# Patient Record
Sex: FEMALE | Race: WHITE | NOT HISPANIC OR LATINO | Employment: FULL TIME | URBAN - METROPOLITAN AREA
[De-identification: names, ages, dates, MRNs, and addresses within clinical notes are randomized per-mention and may not be internally consistent; named-entity substitution may affect disease eponyms.]

---

## 2017-03-29 ENCOUNTER — HOSPITAL ENCOUNTER (EMERGENCY)
Facility: CLINIC | Age: 32
Discharge: HOME | End: 2017-03-29
Attending: EMERGENCY MEDICINE | Admitting: EMERGENCY MEDICINE

## 2017-03-29 VITALS
HEIGHT: 66 IN | RESPIRATION RATE: 16 BRPM | TEMPERATURE: 97.7 F | SYSTOLIC BLOOD PRESSURE: 118 MMHG | OXYGEN SATURATION: 100 % | DIASTOLIC BLOOD PRESSURE: 89 MMHG

## 2017-03-29 DIAGNOSIS — M79.622 PAIN OF LEFT UPPER ARM: ICD-10-CM

## 2017-03-29 PROCEDURE — 99283 EMERGENCY DEPT VISIT LOW MDM: CPT | Performed by: EMERGENCY MEDICINE

## 2017-03-29 PROCEDURE — 99282 EMERGENCY DEPT VISIT SF MDM: CPT

## 2017-03-29 RX ORDER — MORPHINE SULFATE 15 MG/1
15 TABLET ORAL EVERY 4 HOURS PRN
Qty: 8 TABLET | Refills: 0 | Status: SHIPPED | OUTPATIENT
Start: 2017-03-29 | End: 2021-09-21

## 2017-03-29 NOTE — ED AVS SNAPSHOT
[facility] Emergency Department    [address] [address]    Phone:  [phone]    Fax:  [phone]                                       Ninfa Caicedo   MRN: [id number]    Department:  [facility] Emergency Department   Date of Visit:  3/29/2017           Patient Information     Date Of Birth          1985        Your diagnoses for this visit were:     Pain of left upper arm        You were seen by Omer Biggs MD.        Discharge Instructions       I am uncertain what is causing your arm pain, but there does not appear to be any broken bones or infections at this time. Return to the emergency department a few have a rash, fever, chest pain, difficulty breathing, or other concerns.  Otherwise follow up in primary care.    Continue to use heat and ice as well as acetaminophen and ibuprofen to help with her symptoms.  Use morphine as needed for pain that is not controlled by the prior two medications.    Morphine is an addictive narcotic medication, please only take it when the pain is more than can be controlled by acetaminophen or ibuprofen alone. It will also make you lightheaded, nauseated, constipated.  Do not drive, operate heavy machinery, or take care of young children while taking this medication.    Repeated studies have shown that the longer you use narcotic pain medications, the longer it is until you return to normal function. It is our recommendation that you taper off narcotics as quickly as possible with the goal of returning to normal function or near normal function. Long term use of narcotics quickly results in growing tolerance to the medication (less of the benefits) and increased dependence (more of the bad side effects).    Pain is very difficult to treat and we can very rarely take away pain completely. If you are having difficulty with pain over several weeks after an injury, you may need to start different medications and therapies (such as  physical therapy, graded exercise, massage, and acupuncture). Please talk about this with your regular doctor.     24 Hour Appointment Hotline       To make an appointment at any [facility] clinic, call [phone] ([phone]). If you don't have a family doctor or clinic, we will help you find one. [facility] clinics are conveniently located to serve the needs of you and your family.             Review of your medicines      START taking        Dose / Directions Last dose taken    morphine 15 MG IR tablet   Commonly known as:  MSIR   Dose:  15 mg   Quantity:  8 tablet        Take 1 tablet (15 mg) by mouth every 4 hours as needed for moderate to severe pain   Refills:  0          Our records show that you are taking the medicines listed below. If these are incorrect, please call your family doctor or clinic.        Dose / Directions Last dose taken    acetaminophen 500 MG tablet   Commonly known as:  TYLENOL   Dose:  1000 mg        Take 2 tablets (1,000 mg) by mouth every 6 hours as needed for mild pain or fever   Refills:  0        HYDROmorphone 2 MG tablet   Commonly known as:  DILAUDID   Dose:  1-2 mg   Quantity:  10 tablet        Take 0.5-1 tablets (1-2 mg) by mouth every 4 hours as needed for moderate to severe pain   Refills:  0        ibuprofen 400 MG tablet   Commonly known as:  ADVIL/MOTRIN   Dose:  400-800 mg   Quantity:  60 tablet        Take 1-2 tablets (400-800 mg) by mouth every 6 hours as needed for other (cramping)   Refills:  0                Prescriptions were sent or printed at these locations (1 Prescription)                   Other Prescriptions                Printed at Department/Unit printer (1 of 1)         morphine (MSIR) 15 MG IR tablet                Orders Needing Specimen Collection     None      Pending Results     No orders found from 3/27/2017 to 3/30/2017.            Pending Culture Results     No orders found from 3/27/2017 to 3/30/2017.             Test Results from your  hospital stay            Thank you for choosing [facility]       Thank you for choosing [facility] for your care. Our goal is always to provide you with excellent care. Hearing back from our patients is one way we can continue to improve our services. Please take a few minutes to complete the written survey that you may receive in the mail after you visit with us. Thank you!        [employer] Information     [employer] gives you secure access to your electronic health record. If you see a primary care provider, you can also send messages to your care team and make appointments. If you have questions, please call your primary care clinic.  If you do not have a primary care provider, please call [phone] and they will assist you.        Care EveryWhere ID     This is your Care EveryWhere ID. This could be used by other organizations to access your [facility] medical records  [id number]        After Visit Summary       This is your record. Keep this with you and show to your community pharmacist(s) and doctor(s) at your next visit.

## 2017-03-29 NOTE — ED AVS SNAPSHOT
[facility] Emergency Department    [address] [address]    Phone:  [phone]    Fax:  [phone]                                       Ninfa Caicedo   MRN: [id number]    Department:  [facility] Emergency Department   Date of Visit:  3/29/2017           After Visit Summary Signature Page     I have received my discharge instructions, and my questions have been answered. I have discussed any challenges I see with this plan with the nurse or doctor.    ..........................................................................................................................................  Patient/Patient Representative Signature      ..........................................................................................................................................  Patient Representative Print Name and Relationship to Patient    ..................................................               ................................................  Date                                            Time    ..........................................................................................................................................  Reviewed by Signature/Title    ...................................................              ..............................................  Date                                                            Time

## 2017-03-30 NOTE — ED PROVIDER NOTES
"  History     Chief Complaint   Patient presents with     Shoulder Pain     started yesterday, no known injury     HPI  Ninfa Caicedo is a 31 year old female who presents for left arm pain.  Pain started about 24 hours prior to arrival, no known trauma.  Pain started worse in the back of the shoulder, and has migrated around the whole left upper extremity.  She is not sure what makes it better or changes the location.  Pain is worse with moving the arm.  She does have 4 children ages 2, 4, and 8 at home, but does not feel that she has been changing her activities or done anything differently recently to care for them.  No fever, rash, swelling.  She has normal range of motion and no loss of sensation or strength.  She has tried acetaminophen, ibuprofen, and heat without improvement.  Pain is rated as severe, currently located in the radial aspect of the distal forearm.  She denies chest pain, difficulty breathing, abdominal pain, body aches, recent trauma, neck pain, torticollis.    Previously healthy  No daily medications  No known drug allergies, she does have sensitivities to hydrocodone and tramadol and says she vomited when taking penicillin in combination to opiate pain medications, and I have updated her allergy list to reflect this  Doesn't smoke, drink alcohol, or use illicit drugs    I have reviewed the Medications, Allergies, Past Medical and Surgical History, and Social History in the Epic system.    Review of Systems  Pertinent positives and negatives listed in the HPI, all other systems reviewed and are negative.    Physical Exam   BP: (!) 144/100  Heart Rate: 66  Temp: 97.7  F (36.5  C)  Resp: 16  Height: 167.6 cm (5' 6\")  SpO2: 100 %  Physical Exam   Constitutional: She is oriented to person, place, and time. She appears well-developed and well-nourished. No distress.   HENT:   Head: Normocephalic and atraumatic.   Eyes: No scleral icterus.   Neck: Normal range of motion. Neck supple. "   Musculoskeletal:   Left Shoulder: no deformity, erythema or warmth appreciated; no tenderness over clavicle, AC joint, acromion, scapula, coracoid, or proximal humerus; full ROM; no weakness of shoulder abduction or forward flexion; no weakness of rotator cuff muscles; negative impingement  Left Elbow: no deformity, erythema, or warmth appreciated; no tenderness over medial and lateral humeral condyles, olecranon, or radial head; full ROM including pronation and supination of the forearm; normal strength for flexion, extension, pronation and supination.  Left wrist: No deformity, erythema, warmth, or tenderness.  No scaphoid tenderness.  Left Hand: Sensation intact at 1st dorsal webspace, thenar eminence, and volar surface of 5th digit.  Motor strength normal for wrist extension, index-thumb opposition, and finger abduction/adduction.   Neurological: She is alert and oriented to person, place, and time.   Skin: Skin is warm and dry. No rash noted. She is not diaphoretic. No erythema. No pallor.       ED Course     ED Course     Procedures             Critical Care time:  none               Labs Ordered and Resulted from Time of ED Arrival Up to the Time of Departure from the ED - No data to display    Assessments & Plan (with Medical Decision Making)   31-year-old female who presents with left arm pain.  Differential includes muscle spasms, varicella zoster, cellulitis, occult trauma.  No signs of trauma on examination.  Normal examination as above.  No rash to indicate sausage her.  She has full range of motion and good pulses.  Heart rate 66, temperature 36.5 C, SPO2 100% on room air.  Unclear what is causing her symptoms, however I do not believe that imaging is going to be helpful at this time.  I discussed the case with another emergency department physician on site here and he agrees that this does not seem typical of any pattern he has heard of.  At this point the patient is encouraged to continue using  acetaminophen and ibuprofen for the pain as well as heat and ice.  She is given a short course of morphine to help with the pain and told to return if she has continued symptoms in 2-3 days for a recheck or return sooner for some.  The patient is in agreement with this plan.    I have reviewed the nursing notes.    I have reviewed the findings, diagnosis, plan and need for follow up with the patient.    New Prescriptions    MORPHINE (MSIR) 15 MG IR TABLET    Take 1 tablet (15 mg) by mouth every 4 hours as needed for moderate to severe pain       Final diagnoses:   Pain of left upper arm       3/29/2017   [facility] EMERGENCY DEPARTMENT     Omer Biggs MD  03/29/17 9271

## 2017-03-30 NOTE — DISCHARGE INSTRUCTIONS
I am uncertain what is causing your arm pain, but there does not appear to be any broken bones or infections at this time. Return to the emergency department a few have a rash, fever, chest pain, difficulty breathing, or other concerns.  Otherwise follow up in primary care.    Continue to use heat and ice as well as acetaminophen and ibuprofen to help with her symptoms.  Use morphine as needed for pain that is not controlled by the prior two medications.    Morphine is an addictive narcotic medication, please only take it when the pain is more than can be controlled by acetaminophen or ibuprofen alone. It will also make you lightheaded, nauseated, constipated.  Do not drive, operate heavy machinery, or take care of young children while taking this medication.    Repeated studies have shown that the longer you use narcotic pain medications, the longer it is until you return to normal function. It is our recommendation that you taper off narcotics as quickly as possible with the goal of returning to normal function or near normal function. Long term use of narcotics quickly results in growing tolerance to the medication (less of the benefits) and increased dependence (more of the bad side effects).    Pain is very difficult to treat and we can very rarely take away pain completely. If you are having difficulty with pain over several weeks after an injury, you may need to start different medications and therapies (such as physical therapy, graded exercise, massage, and acupuncture). Please talk about this with your regular doctor.

## 2017-07-08 ENCOUNTER — HEALTH MAINTENANCE LETTER (OUTPATIENT)
Age: 32
End: 2017-07-08

## 2019-02-25 ENCOUNTER — HOSPITAL ENCOUNTER (EMERGENCY)
Facility: CLINIC | Age: 34
Discharge: HOME | End: 2019-02-25
Attending: NURSE PRACTITIONER | Admitting: NURSE PRACTITIONER

## 2019-02-25 VITALS
RESPIRATION RATE: 20 BRPM | OXYGEN SATURATION: 99 % | DIASTOLIC BLOOD PRESSURE: 78 MMHG | HEART RATE: 115 BPM | TEMPERATURE: 100.6 F | SYSTOLIC BLOOD PRESSURE: 124 MMHG

## 2019-02-25 DIAGNOSIS — J02.0 ACUTE STREPTOCOCCAL PHARYNGITIS: ICD-10-CM

## 2019-02-25 LAB
INTERNAL QC OK POCT: YES
S PYO AG THROAT QL IA.RAPID: POSITIVE

## 2019-02-25 PROCEDURE — 99214 OFFICE O/P EST MOD 30 MIN: CPT | Mod: Z6 | Performed by: NURSE PRACTITIONER

## 2019-02-25 PROCEDURE — 87880 STREP A ASSAY W/OPTIC: CPT | Performed by: NURSE PRACTITIONER

## 2019-02-25 PROCEDURE — G0463 HOSPITAL OUTPT CLINIC VISIT: HCPCS | Performed by: NURSE PRACTITIONER

## 2019-02-25 RX ORDER — DEXAMETHASONE 4 MG/1
10 TABLET ORAL ONCE
Qty: 2.5 TABLET | Refills: 0 | Status: SHIPPED | OUTPATIENT
Start: 2019-02-25 | End: 2019-03-20

## 2019-02-25 RX ORDER — AMOXICILLIN 500 MG/1
500 CAPSULE ORAL 2 TIMES DAILY
Qty: 20 CAPSULE | Refills: 0 | Status: SHIPPED | OUTPATIENT
Start: 2019-02-25 | End: 2019-03-07

## 2019-02-25 ASSESSMENT — ENCOUNTER SYMPTOMS
ABDOMINAL PAIN: 0
CHILLS: 1
HEADACHES: 1
TROUBLE SWALLOWING: 0
MYALGIAS: 1
FEVER: 1
SHORTNESS OF BREATH: 0
SORE THROAT: 1
VOMITING: 0
NAUSEA: 0
COUGH: 0
FATIGUE: 1

## 2019-02-25 NOTE — ED AVS SNAPSHOT
[facility] Emergency Department  [address] [address]  Phone:  [phone]  Fax:  [phone]                                    Ninfa Caicedo   MRN: [id number]    Department:  [facility] Emergency Department   Date of Visit:  2/25/2019           After Visit Summary Signature Page    I have received my discharge instructions, and my questions have been answered. I have discussed any challenges I see with this plan with the nurse or doctor.    ..........................................................................................................................................  Patient/Patient Representative Signature      ..........................................................................................................................................  Patient Representative Print Name and Relationship to Patient    ..................................................               ................................................  Date                                   Time    ..........................................................................................................................................  Reviewed by Signature/Title    ...................................................              ..............................................  Date                                               Time          22EPIC Rev 08/18

## 2019-02-25 NOTE — ED PROVIDER NOTES
History     Chief Complaint   Patient presents with     Pharyngitis     HPI  Ninfa Caicedo is a 33 year old female who presents to urgent care for evaluation of sore throat and fever.  Symptoms started 2 days ago.  Accompanied by myalgias.  Denies nausea or vomiting.  Denies cough or nasal congestion.  Increased pain with swallowing.  Drinking fluids, but reports it is difficult due to pain with swallowing.    Allergies:  No Known Allergies    Problem List:    Patient Active Problem List    Diagnosis Date Noted     Pyelonephritis 04/08/2016     Priority: Medium     Vaginal delivery 06/10/2014     Priority: Medium     Encounter for supervision of other normal pregnancy 03/17/2014     Priority: Medium     Diagnosis updated by automated process. Provider to review and confirm.       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  Swab negative 3/14  Swab negative 6/14         Radial styloid tenosynovitis 04/12/2005     Priority: Medium        Past Medical History:    Past Medical History:   Diagnosis Date     Allergic rhinitis due to other allergen      Chickenpox      Tendonitis        Past Surgical History:    Past Surgical History:   Procedure Laterality Date     ORTHOPEDIC SURGERY      bilat wrist       Family History:    Family History   Problem Relation Age of Onset     Psychotic Disorder Father         depression     Prostate Cancer Maternal Grandfather      Diabetes Maternal Grandfather      Cardiovascular Paternal Grandfather         pacemaker     Hypertension Mother      Gastrointestinal Disease Maternal Grandfather         Hepatitis C-blood transfusion     Depression Father        Social History:  Marital Status:  Single [1]  Social History     Tobacco Use     Smoking status: Never Smoker      Smokeless tobacco: Never Used   Substance Use Topics     Alcohol use: No     Drug use: No        Medications:      amoxicillin (AMOXIL) 500 MG capsule   dexamethasone (DECADRON) 4 MG tablet   acetaminophen (TYLENOL) 500 MG tablet   HYDROmorphone (DILAUDID) 2 MG tablet   ibuprofen (ADVIL,MOTRIN) 400 MG tablet   morphine (MSIR) 15 MG IR tablet         Review of Systems   Constitutional: Positive for chills, fatigue and fever.   HENT: Positive for sore throat. Negative for congestion, ear pain and trouble swallowing.    Respiratory: Negative for cough and shortness of breath.    Cardiovascular: Negative for chest pain.   Gastrointestinal: Negative for abdominal pain, nausea and vomiting.   Musculoskeletal: Positive for myalgias.   Skin: Negative for rash.   Neurological: Positive for headaches.       Physical Exam   BP: 124/78  Pulse: 115  Temp: 100.6  F (38.1  C)  Resp: 20  SpO2: 99 %      Physical Exam  GENERAL APPEARANCE: healthy, alert and no distress  EYES: EOMI,  PERRL, conjunctiva clear  HENT: ear canals and TM's normal.  Nose normal. Bilateral tonsillary hypertrophy (3+/3+) Posterior oropharynx erythema without exudate.  Uvula is midline.  No unilateral peritonsillar swelling. No trismus. Voice sounds muffled.  NECK: supple, nontender, anterior cervical bilateral lymphadenopathy  RESP: lungs clear to auscultation - no rales, rhonchi or wheezes  CV: tachycardia present and regular rhythm, normal S1 S2, no murmur noted    ED Course        Procedures               Results for orders placed or performed during the hospital encounter of 02/25/19 (from the past 24 hour(s))   Rapid strep group A screen POCT   Result Value Ref Range    Rapid Strep A Screen POSITIVE neg    Internal QC OK Yes        Medications - No data to display    Assessments & Plan (with Medical Decision Making)   RST positive.  Patient will be initiated on Amoxicillin and a dose of Decadron. Patient notified contagious for 24 hours after initiating  antibiotics.  Follow up with PCP if no improvement in 3-5 days.  Worrisome reasons to seek care sooner discussed.      I have reviewed the nursing notes.    I have reviewed the findings, diagnosis, plan and need for follow up with the patient.         Medication List      Started    amoxicillin 500 MG capsule  Commonly known as:  AMOXIL  500 mg, Oral, 2 TIMES DAILY     dexamethasone 4 MG tablet  Commonly known as:  DECADRON  10 mg, Oral, ONCE            Final diagnoses:   Acute streptococcal pharyngitis       2/25/2019   [facility] EMERGENCY DEPARTMENT     Lakisha Burnette APRN CNP  02/25/19 3310

## 2019-03-20 ENCOUNTER — APPOINTMENT (OUTPATIENT)
Dept: CT IMAGING | Facility: CLINIC | Age: 34
End: 2019-03-20
Attending: STUDENT IN AN ORGANIZED HEALTH CARE EDUCATION/TRAINING PROGRAM

## 2019-03-20 ENCOUNTER — HOSPITAL ENCOUNTER (EMERGENCY)
Facility: CLINIC | Age: 34
Discharge: HOME | End: 2019-03-20
Attending: STUDENT IN AN ORGANIZED HEALTH CARE EDUCATION/TRAINING PROGRAM | Admitting: STUDENT IN AN ORGANIZED HEALTH CARE EDUCATION/TRAINING PROGRAM

## 2019-03-20 VITALS
TEMPERATURE: 99.4 F | OXYGEN SATURATION: 100 % | WEIGHT: 180 LBS | DIASTOLIC BLOOD PRESSURE: 94 MMHG | RESPIRATION RATE: 16 BRPM | BODY MASS INDEX: 29.05 KG/M2 | SYSTOLIC BLOOD PRESSURE: 126 MMHG

## 2019-03-20 DIAGNOSIS — J03.90 TONSILLITIS: ICD-10-CM

## 2019-03-20 PROCEDURE — 25000128 H RX IP 250 OP 636: Performed by: STUDENT IN AN ORGANIZED HEALTH CARE EDUCATION/TRAINING PROGRAM

## 2019-03-20 PROCEDURE — 70491 CT SOFT TISSUE NECK W/DYE: CPT

## 2019-03-20 PROCEDURE — 99284 EMERGENCY DEPT VISIT MOD MDM: CPT | Mod: Z6 | Performed by: STUDENT IN AN ORGANIZED HEALTH CARE EDUCATION/TRAINING PROGRAM

## 2019-03-20 PROCEDURE — 99284 EMERGENCY DEPT VISIT MOD MDM: CPT

## 2019-03-20 PROCEDURE — 25000125 ZZHC RX 250: Performed by: STUDENT IN AN ORGANIZED HEALTH CARE EDUCATION/TRAINING PROGRAM

## 2019-03-20 RX ORDER — IOPAMIDOL 755 MG/ML
80 INJECTION, SOLUTION INTRAVASCULAR ONCE
Status: COMPLETED | OUTPATIENT
Start: 2019-03-20 | End: 2019-03-20

## 2019-03-20 RX ORDER — CEPHALEXIN 500 MG/1
500 CAPSULE ORAL 2 TIMES DAILY
Qty: 14 CAPSULE | Refills: 0 | Status: SHIPPED | OUTPATIENT
Start: 2019-03-20 | End: 2019-03-27

## 2019-03-20 RX ORDER — DEXAMETHASONE SODIUM PHOSPHATE 4 MG/ML
10 VIAL (ML) INJECTION ONCE
Status: COMPLETED | OUTPATIENT
Start: 2019-03-20 | End: 2019-03-20

## 2019-03-20 RX ORDER — DEXAMETHASONE 4 MG/1
12 TABLET ORAL ONCE
Qty: 3 TABLET | Refills: 0 | Status: SHIPPED | OUTPATIENT
Start: 2019-03-20 | End: 2021-09-21

## 2019-03-20 RX ADMIN — IOPAMIDOL 80 ML: 755 INJECTION, SOLUTION INTRAVENOUS at 02:12

## 2019-03-20 RX ADMIN — DEXAMETHASONE SODIUM PHOSPHATE 10 MG: 4 INJECTION, SOLUTION INTRAMUSCULAR; INTRAVENOUS at 03:24

## 2019-03-20 RX ADMIN — SODIUM CHLORIDE 80 ML: 9 INJECTION, SOLUTION INTRAVENOUS at 02:13

## 2019-03-20 NOTE — ED AVS SNAPSHOT
[facility] Emergency Department  [address] [address]  Phone:  [phone]  Fax:  [phone]                                    Ninfa Caicedo   MRN: [id number]    Department:  [facility] Emergency Department   Date of Visit:  3/20/2019           After Visit Summary Signature Page    I have received my discharge instructions, and my questions have been answered. I have discussed any challenges I see with this plan with the nurse or doctor.    ..........................................................................................................................................  Patient/Patient Representative Signature      ..........................................................................................................................................  Patient Representative Print Name and Relationship to Patient    ..................................................               ................................................  Date                                   Time    ..........................................................................................................................................  Reviewed by Signature/Title    ...................................................              ..............................................  Date                                               Time          22EPIC Rev 08/18

## 2019-03-20 NOTE — ED PROVIDER NOTES
History     Chief Complaint   Patient presents with     Pharyngitis     recent strep dx.  Continues to have swelling on one side     Rash     chest     HPI  Ninfa Caicedo is a 33 year old female diagnosed with strep pharyngitis on 2/25/19 and had finished oral antibiotic therapy with amoxicillin presenting today for persistent right sided sore throat.  She admits that her symptoms dramatically improved with the antibiotic but right-sided throat pain never completely resolved.  The pain is only mild and tolerable but persists, when looking in the mirror she feels that the right side appears larger than the left.  She denies fever, chills, headache, difficulty breathing, chest pain, cough, or genitourinary symptoms.  Secondarily she has noted a rash along her anterior torso but improving over the past 1 week.    Allergies:  No Known Allergies    Problem List:    Patient Active Problem List    Diagnosis Date Noted     Pyelonephritis 04/08/2016     Priority: Medium     Vaginal delivery 06/10/2014     Priority: Medium     Encounter for supervision of other normal pregnancy 03/17/2014     Priority: Medium     Diagnosis updated by automated process. Provider to review and confirm.       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  Swab negative 3/14  Swab negative 6/14         Radial styloid tenosynovitis 04/12/2005     Priority: Medium        Past Medical History:    Past Medical History:   Diagnosis Date     Allergic rhinitis due to other allergen      Chickenpox      Tendonitis        Past Surgical History:    Past Surgical History:   Procedure Laterality Date     ORTHOPEDIC SURGERY      bilat wrist       Family History:    Family History   Problem Relation Age of Onset     Psychotic Disorder Father          depression     Prostate Cancer Maternal Grandfather      Diabetes Maternal Grandfather      Cardiovascular Paternal Grandfather         pacemaker     Hypertension Mother      Gastrointestinal Disease Maternal Grandfather         Hepatitis C-blood transfusion     Depression Father        Social History:  Marital Status:  Single [1]  Social History     Tobacco Use     Smoking status: Never Smoker     Smokeless tobacco: Never Used   Substance Use Topics     Alcohol use: No     Drug use: No        Medications:      acetaminophen (TYLENOL) 500 MG tablet   cephALEXin (KEFLEX) 500 MG capsule   dexamethasone (DECADRON) 4 MG tablet   ibuprofen (ADVIL,MOTRIN) 400 MG tablet   HYDROmorphone (DILAUDID) 2 MG tablet   morphine (MSIR) 15 MG IR tablet         Review of Systems  Constitutional:  Negative for fever or chills.  HENT: Positive for sore throat, improving.  Respiratory:  Negative for cough or shortness of breath.  Gastrointestinal:  Negative for abdominal pain, vomiting, or diarrhea.    Musculoskeletal: Negative for joint pain or swelling.  Neurological:  Negative for headache.  Skin: Positive for faint skin rash of anterior torso.    All others reviewed and are negative.      Physical Exam   BP: (!) 126/94  Heart Rate: 105  Temp: 97.8  F (36.6  C)  Resp: 16  Weight: 81.6 kg (180 lb)  SpO2: 100 %      Physical Exam  Constitutional:  Well developed, well nourished.  Appears nontoxic and in no acute distress.   HENT:  Normocephalic and atraumatic.  Symmetric in appearance.  Eyes:  Conjunctivae are normal.  Oral:  Patient is without trismus.  Moist oral mucosa.  Right-sided palatine tonsillar erythema and hypertrophy.  No uvular asymmetry.  Without sublingual or submental swelling.  Voice is not muffled.  Tolerates secretions.  Neck:  Neck supple and without nuchal rigidity.  Cardiovascular:  No cyanosis.  RRR.  No audible murmurs noted.    Respiratory:  Effort normal without sign of respiratory distress.  CTAB without  "diminished regions.   Gastrointestinal:  Soft nondistended abdomen.  Nontender and without guarding.  No rigidity or rebound tenderness.  Negative for organomegaly.  Genitourinary:  Noncontributory.   Musculoskeletal:  Moves extremities spontaneously.  Neurological:  Patient is alert.  Skin:  Skin is warm and dry.  Psychiatric:  Normal mood and affect.      ED Course        Procedures               Critical Care time:  none               No results found for this or any previous visit (from the past 24 hour(s)).    Medications   dexamethasone (DECADRON) oral solution (inj used orally) 10 mg (not administered)   iopamidol (ISOVUE-370) solution 80 mL (80 mLs Intravenous Given 3/20/19 0212)   sodium chloride 0.9 % bag 500mL for CT scan flush use (80 mLs Intravenous Given 3/20/19 0213)       Assessments & Plan (with Medical Decision Making)   Ninfa Caicedo is a 33 year old female who presents to the department for evaluation of persistent right-sided sore throat despite diagnosis and treatment for strep pharyngitis 2 weeks ago with oral antibiotic agent amoxicillin.  Symptoms greatly improved but she still appears to have right-sided tonsillitis.  Differential diagnosis included peritonsillar abscess, retropharyngeal abscess, epiglottitis and Mark's angina.  CT scan of soft tissue neck was ordered and preliminary read indicates \"prominent tonsils and mild pharyngeal mucosal thickening collections or abscess\".  Clinically patient appears to have right-sided tonsillitis, will be treated with oral dexamethasone and antibiotic Keflex for possibility of partially treated strep pharyngitis.  She is agreeable discharge plan including outpatient ENT follow-up if symptoms do not resolve over the next 3-5 days.      Disclaimer:  This note consists of symbols derived from keyboarding, dictation, and/or voice recognition software.  As a result, there may be errors in the script that have gone undetected.  Please consider this " when interpreting information found in the chart.        I have reviewed the nursing notes.    I have reviewed the findings, diagnosis, plan and need for follow up with the patient.         Medication List      Started    cephALEXin 500 MG capsule  Commonly known as:  KEFLEX  500 mg, Oral, 2 TIMES DAILY        Modified    dexamethasone 4 MG tablet  Commonly known as:  DECADRON  12 mg, Oral, ONCE  What changed:  how much to take        ASK your doctor about these medications    amoxicillin 500 MG capsule  Commonly known as:  AMOXIL  500 mg, Oral, 2 TIMES DAILY  Ask about: Should I take this medication?            Final diagnoses:   Tonsillitis       3/20/2019   [facility] EMERGENCY DEPARTMENT     Juan Muniz DO  03/20/19 0317

## 2019-04-01 ENCOUNTER — HOSPITAL ENCOUNTER (EMERGENCY)
Facility: CLINIC | Age: 34
Discharge: HOME | End: 2019-04-01
Attending: PHYSICIAN ASSISTANT | Admitting: PHYSICIAN ASSISTANT

## 2019-04-01 VITALS
TEMPERATURE: 98.2 F | RESPIRATION RATE: 16 BRPM | DIASTOLIC BLOOD PRESSURE: 85 MMHG | HEART RATE: 87 BPM | WEIGHT: 180 LBS | SYSTOLIC BLOOD PRESSURE: 139 MMHG | BODY MASS INDEX: 29.05 KG/M2 | OXYGEN SATURATION: 99 %

## 2019-04-01 DIAGNOSIS — R07.0 THROAT PAIN: ICD-10-CM

## 2019-04-01 LAB
BASOPHILS # BLD AUTO: 0 10E9/L (ref 0–0.2)
BASOPHILS NFR BLD AUTO: 0.6 %
DIFFERENTIAL METHOD BLD: NORMAL
EOSINOPHIL # BLD AUTO: 0.1 10E9/L (ref 0–0.7)
EOSINOPHIL NFR BLD AUTO: 1 %
ERYTHROCYTE [DISTWIDTH] IN BLOOD BY AUTOMATED COUNT: 13.2 % (ref 10–15)
HCT VFR BLD AUTO: 42.3 % (ref 35–47)
HETEROPH AB SER QL: NEGATIVE
HGB BLD-MCNC: 13.7 G/DL (ref 11.7–15.7)
IMM GRANULOCYTES # BLD: 0 10E9/L (ref 0–0.4)
IMM GRANULOCYTES NFR BLD: 0.1 %
INTERNAL QC OK POCT: YES
LYMPHOCYTES # BLD AUTO: 2.6 10E9/L (ref 0.8–5.3)
LYMPHOCYTES NFR BLD AUTO: 36.2 %
MCH RBC QN AUTO: 29.5 PG (ref 26.5–33)
MCHC RBC AUTO-ENTMCNC: 32.4 G/DL (ref 31.5–36.5)
MCV RBC AUTO: 91 FL (ref 78–100)
MONOCYTES # BLD AUTO: 0.3 10E9/L (ref 0–1.3)
MONOCYTES NFR BLD AUTO: 4.4 %
NEUTROPHILS # BLD AUTO: 4.2 10E9/L (ref 1.6–8.3)
NEUTROPHILS NFR BLD AUTO: 57.7 %
NRBC # BLD AUTO: 0 10*3/UL
NRBC BLD AUTO-RTO: 0 /100
PLATELET # BLD AUTO: 297 10E9/L (ref 150–450)
RBC # BLD AUTO: 4.64 10E12/L (ref 3.8–5.2)
S PYO AG THROAT QL IA.RAPID: NEGATIVE
WBC # BLD AUTO: 7.2 10E9/L (ref 4–11)

## 2019-04-01 PROCEDURE — 87081 CULTURE SCREEN ONLY: CPT | Performed by: PHYSICIAN ASSISTANT

## 2019-04-01 PROCEDURE — 87880 STREP A ASSAY W/OPTIC: CPT | Performed by: PHYSICIAN ASSISTANT

## 2019-04-01 PROCEDURE — 99214 OFFICE O/P EST MOD 30 MIN: CPT | Mod: Z6 | Performed by: PHYSICIAN ASSISTANT

## 2019-04-01 PROCEDURE — 85025 COMPLETE CBC W/AUTO DIFF WBC: CPT | Performed by: PHYSICIAN ASSISTANT

## 2019-04-01 PROCEDURE — G0463 HOSPITAL OUTPT CLINIC VISIT: HCPCS | Performed by: PHYSICIAN ASSISTANT

## 2019-04-01 PROCEDURE — 86308 HETEROPHILE ANTIBODY SCREEN: CPT | Performed by: PHYSICIAN ASSISTANT

## 2019-04-01 ASSESSMENT — ENCOUNTER SYMPTOMS
FEVER: 0
SORE THROAT: 1

## 2019-04-01 NOTE — ED AVS SNAPSHOT
[facility] Emergency Department  [address] [address]  Phone:  [phone]  Fax:  [phone]                                    Ninfa Caicedo   MRN: [id number]    Department:  [facility] Emergency Department   Date of Visit:  4/1/2019           After Visit Summary Signature Page    I have received my discharge instructions, and my questions have been answered. I have discussed any challenges I see with this plan with the nurse or doctor.    ..........................................................................................................................................  Patient/Patient Representative Signature      ..........................................................................................................................................  Patient Representative Print Name and Relationship to Patient    ..................................................               ................................................  Date                                   Time    ..........................................................................................................................................  Reviewed by Signature/Title    ...................................................              ..............................................  Date                                               Time          22EPIC Rev 08/18

## 2019-04-02 NOTE — DISCHARGE INSTRUCTIONS
No antibiotics indicated at this time.  Throat culture currently pending.    Patient advised to call for any lab results (if obtained during visit) within 2-3 days.     Symptomatic treatment with fluids, rest, salt water gargles, and cool humidifier.  May use acetaminophen, ibuprofen as needed.     Patient follow-up with primary care doctor in 1 week if symptoms persist or fail to improve.  May need to follow-up with ENT if these symptoms persist since this is been an ongoing issue on and off for the past 2 months.    Return to care if any worsening symptoms or if not improving ([place] may need to be ruled out if symptoms fail to improve).    Patient to go to Emergency Room if drooling, change in voice, difficulty swallowing or talking, or persistent fevers occur.      Patient voiced understanding of instructions given.

## 2019-04-02 NOTE — ED PROVIDER NOTES
History     Chief Complaint   Patient presents with     Pharyngitis     sore throat with strep X2.      HPI    Ninfa Caicedo  is a 33 year old female who is here today because of: Sore Throat.  The patient has had symptoms of earache, sore throat and nasal congestion/runny nose.   Onset of symptoms was 4 days ago. Course of illness is same.  Patient admits to exposure to illness at home or work/school. Boyfriend with strep throat.   Patient denies fever, cough, nausea, vomiting, diarrhea and headache  Treatment measures tried include acetaminophen, ibuprofen.    Patient states she finished antibiotic 7 days ago for strep throat and has had strep throat twice in the past 2 months.  Patient states she does not feel like she is never completely gotten over strep throat since being on 2 rounds of antibiotics.    Patient denies abdominal pain, rash, history of mono in the past.    Problem list, Medication list, Allergies, and Medical/Social/Surgical histories reviewed in [facility] and updated as appropriate.    Allergies:  No Known Allergies    Problem List:    Patient Active Problem List    Diagnosis Date Noted     Pyelonephritis 04/08/2016     Priority: Medium     Vaginal delivery 06/10/2014     Priority: Medium     Encounter for supervision of other normal pregnancy 03/17/2014     Priority: Medium     Diagnosis updated by automated process. Provider to review and confirm.       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  Swab negative 3/14  Swab negative 6/14         Radial styloid tenosynovitis 04/12/2005     Priority: Medium        Past Medical History:    Past Medical History:   Diagnosis Date     Allergic rhinitis due to other allergen      Chickenpox      Tendonitis        Past Surgical History:     Past Surgical History:   Procedure Laterality Date     ORTHOPEDIC SURGERY      bilat wrist       Family History:    Family History   Problem Relation Age of Onset     Psychotic Disorder Father         depression     Prostate Cancer Maternal Grandfather      Diabetes Maternal Grandfather      Cardiovascular Paternal Grandfather         pacemaker     Hypertension Mother      Gastrointestinal Disease Maternal Grandfather         Hepatitis C-blood transfusion     Depression Father        Social History:  Marital Status:  Single [1]  Social History     Tobacco Use     Smoking status: Never Smoker     Smokeless tobacco: Never Used   Substance Use Topics     Alcohol use: No     Drug use: No        Medications:      acetaminophen (TYLENOL) 500 MG tablet   dexamethasone (DECADRON) 4 MG tablet   HYDROmorphone (DILAUDID) 2 MG tablet   ibuprofen (ADVIL,MOTRIN) 400 MG tablet   morphine (MSIR) 15 MG IR tablet         Review of Systems   Constitutional: Negative for fever.   HENT: Positive for congestion, ear pain and sore throat.    All other systems reviewed and are negative.      Physical Exam   BP: 139/85  Pulse: 87  Temp: 98.2  F (36.8  C)  Resp: 16  Weight: 81.6 kg (180 lb)  SpO2: 99 %      Physical Exam     /85   Pulse 87   Temp 98.2  F (36.8  C) (Oral)   Resp 16   Wt 81.6 kg (180 lb)   SpO2 99%   BMI 29.05 kg/m     General: healthy, alert with no acute distress, and non toxic in appearance  Eyes - conjunctivae clear.  Ears - External ears normal. Canals clear. TM's normal.  Nose/Sinuses - Nares normal.Mucosa normal. No drainage or sinus tenderness.  Oropharynx - Lips, mucosa, and tongue normal. Positive findings: mild oropharyngeal erythema. No tonsillar hypertrophy or exudates present. Uvula midline, no dysphonia, dysphagia, trismus, or drooling.   Neck - Neck supple; Positive findings: moderate anterior cervical nodes. No meningeal signs.   Lungs - Lungs clear; no wheezing or rales.  Heart - regular rate  and rhythm. No murmurs, rub.  Abdomen: Abdomen soft, non-tender. BS normal. No masses, organomegaly  SKIN: no suspicious lesions or rashes    Labs:  Rapid Strep test is negative; await throat culture results.  Results for orders placed or performed during the hospital encounter of 04/01/19 (from the past 24 hour(s))   Rapid strep group A screen POCT   Result Value Ref Range    Rapid Strep A Screen NEGATIVE neg    Internal QC OK Yes          ED Course        Procedures              Critical Care time:  none               Results for orders placed or performed during the hospital encounter of 04/01/19 (from the past 24 hour(s))   Rapid strep group A screen POCT   Result Value Ref Range    Rapid Strep A Screen NEGATIVE neg    Internal QC OK Yes    CBC with platelets differential   Result Value Ref Range    WBC 7.2 4.0 - 11.0 10e9/L    RBC Count 4.64 3.8 - 5.2 10e12/L    Hemoglobin 13.7 11.7 - 15.7 g/dL    Hematocrit 42.3 35.0 - 47.0 %    MCV 91 78 - 100 fl    MCH 29.5 26.5 - 33.0 pg    MCHC 32.4 31.5 - 36.5 g/dL    RDW 13.2 10.0 - 15.0 %    Platelet Count 297 150 - 450 10e9/L    Diff Method Automated Method     % Neutrophils 57.7 %    % Lymphocytes 36.2 %    % Monocytes 4.4 %    % Eosinophils 1.0 %    % Basophils 0.6 %    % Immature Granulocytes 0.1 %    Nucleated RBCs 0 0 /100    Absolute Neutrophil 4.2 1.6 - 8.3 10e9/L    Absolute Lymphocytes 2.6 0.8 - 5.3 10e9/L    Absolute Monocytes 0.3 0.0 - 1.3 10e9/L    Absolute Eosinophils 0.1 0.0 - 0.7 10e9/L    Absolute Basophils 0.0 0.0 - 0.2 10e9/L    Abs Immature Granulocytes 0.0 0 - 0.4 10e9/L    Absolute Nucleated RBC 0.0    Mononucleosis screen   Result Value Ref Range    Mononucleosis Screen Negative NEG^Negative       Medications - No data to display    Assessments & Plan (with Medical Decision Making)     I have reviewed the nursing notes.    I have reviewed the findings, diagnosis, plan and need for follow up with the patient.   33-year-old female presents the  urgent care with sore throat for the past 4 days, congestion, and ear pain.  Patient boyfriend tested positive for strep throat today.  Patient has had treatment for strep throat 2 times in the past 2 months that she does not feel like she is completely gotten rid of her sore throat symptoms.  Rapid strep obtained in office today and was negative.  Throat culture currently pending.  Mono spot obtained in office today was negative.  CBC also obtained in office today and was within normal limits.  No concerns at this time for peritonsillar abscess, Mark's angina or cellulitis.  Discussed with patient that at this time symptoms appear to be viral in origin and to increase fluids, rest, Tylenol and ibuprofen and saltwater gargles over-the-counter as needed.  Patient to follow-up with ENT if symptoms persist or fail to improve.  This was discussed with patient and given on discharge paperwork.  Patient return if symptoms worsen or change.  Patient discharged in stable condition.  No antibiotics indicated at this time.        Final diagnoses:   Throat pain       4/1/2019   [facility] EMERGENCY DEPARTMENT    Helen Hernandes PA-C  04/01/19 1935

## 2019-04-03 LAB
BACTERIA SPEC CULT: NORMAL
Lab: NORMAL
SPECIMEN SOURCE: NORMAL

## 2019-04-03 NOTE — RESULT ENCOUNTER NOTE
Final Beta strep group A r/o culture is NEGATIVE for Group A streptococcus.    No treatment or change in treatment per [place] Strep protocol.

## 2019-11-02 ENCOUNTER — HEALTH MAINTENANCE LETTER (OUTPATIENT)
Age: 34
End: 2019-11-02

## 2020-11-16 ENCOUNTER — HEALTH MAINTENANCE LETTER (OUTPATIENT)
Age: 35
End: 2020-11-16

## 2021-09-12 ENCOUNTER — HEALTH MAINTENANCE LETTER (OUTPATIENT)
Age: 36
End: 2021-09-12

## 2021-09-21 ENCOUNTER — HOSPITAL ENCOUNTER (EMERGENCY)
Facility: CLINIC | Age: 36
Discharge: HOME | End: 2021-09-21
Attending: PHYSICIAN ASSISTANT | Admitting: PHYSICIAN ASSISTANT
Payer: COMMERCIAL

## 2021-09-21 ENCOUNTER — APPOINTMENT (OUTPATIENT)
Dept: ULTRASOUND IMAGING | Facility: CLINIC | Age: 36
End: 2021-09-21
Attending: PHYSICIAN ASSISTANT
Payer: COMMERCIAL

## 2021-09-21 VITALS
DIASTOLIC BLOOD PRESSURE: 85 MMHG | TEMPERATURE: 98 F | SYSTOLIC BLOOD PRESSURE: 144 MMHG | BODY MASS INDEX: 29.86 KG/M2 | RESPIRATION RATE: 20 BRPM | OXYGEN SATURATION: 99 % | WEIGHT: 185 LBS | HEART RATE: 90 BPM

## 2021-09-21 DIAGNOSIS — O03.4 INCOMPLETE MISCARRIAGE: ICD-10-CM

## 2021-09-21 LAB
ALBUMIN UR-MCNC: NEGATIVE MG/DL
APPEARANCE UR: CLEAR
B-HCG SERPL-ACNC: 3317 IU/L (ref 0–5)
BACTERIA #/AREA URNS HPF: ABNORMAL /HPF
BASOPHILS # BLD AUTO: 0 10E3/UL (ref 0–0.2)
BASOPHILS NFR BLD AUTO: 0 %
BILIRUB UR QL STRIP: NEGATIVE
COLOR UR AUTO: ABNORMAL
EOSINOPHIL # BLD AUTO: 0.1 10E3/UL (ref 0–0.7)
EOSINOPHIL NFR BLD AUTO: 1 %
ERYTHROCYTE [DISTWIDTH] IN BLOOD BY AUTOMATED COUNT: 12.8 % (ref 10–15)
GLUCOSE UR STRIP-MCNC: NEGATIVE MG/DL
HCT VFR BLD AUTO: 40 % (ref 35–47)
HGB BLD-MCNC: 13.4 G/DL (ref 11.7–15.7)
HGB UR QL STRIP: ABNORMAL
HOLD SPECIMEN: NORMAL
IMM GRANULOCYTES # BLD: 0 10E3/UL
IMM GRANULOCYTES NFR BLD: 0 %
KETONES UR STRIP-MCNC: NEGATIVE MG/DL
LEUKOCYTE ESTERASE UR QL STRIP: ABNORMAL
LYMPHOCYTES # BLD AUTO: 1.9 10E3/UL (ref 0.8–5.3)
LYMPHOCYTES NFR BLD AUTO: 24 %
MCH RBC QN AUTO: 29.9 PG (ref 26.5–33)
MCHC RBC AUTO-ENTMCNC: 33.5 G/DL (ref 31.5–36.5)
MCV RBC AUTO: 89 FL (ref 78–100)
MONOCYTES # BLD AUTO: 0.3 10E3/UL (ref 0–1.3)
MONOCYTES NFR BLD AUTO: 4 %
MUCOUS THREADS #/AREA URNS LPF: PRESENT /LPF
NEUTROPHILS # BLD AUTO: 5.4 10E3/UL (ref 1.6–8.3)
NEUTROPHILS NFR BLD AUTO: 71 %
NITRATE UR QL: NEGATIVE
NRBC # BLD AUTO: 0 10E3/UL
NRBC BLD AUTO-RTO: 0 /100
PH UR STRIP: 6 [PH] (ref 5–7)
PLATELET # BLD AUTO: 309 10E3/UL (ref 150–450)
RBC # BLD AUTO: 4.48 10E6/UL (ref 3.8–5.2)
RBC URINE: 6 /HPF
SP GR UR STRIP: 1.01 (ref 1–1.03)
SQUAMOUS EPITHELIAL: 2 /HPF
UROBILINOGEN UR STRIP-MCNC: NORMAL MG/DL
WBC # BLD AUTO: 7.7 10E3/UL (ref 4–11)
WBC URINE: 7 /HPF

## 2021-09-21 PROCEDURE — 81001 URINALYSIS AUTO W/SCOPE: CPT | Performed by: FAMILY MEDICINE

## 2021-09-21 PROCEDURE — 85025 COMPLETE CBC W/AUTO DIFF WBC: CPT | Performed by: PHYSICIAN ASSISTANT

## 2021-09-21 PROCEDURE — 81001 URINALYSIS AUTO W/SCOPE: CPT | Performed by: PHYSICIAN ASSISTANT

## 2021-09-21 PROCEDURE — 87086 URINE CULTURE/COLONY COUNT: CPT | Performed by: PHYSICIAN ASSISTANT

## 2021-09-21 PROCEDURE — 99285 EMERGENCY DEPT VISIT HI MDM: CPT | Performed by: PHYSICIAN ASSISTANT

## 2021-09-21 PROCEDURE — 36415 COLL VENOUS BLD VENIPUNCTURE: CPT | Performed by: PHYSICIAN ASSISTANT

## 2021-09-21 PROCEDURE — 99284 EMERGENCY DEPT VISIT MOD MDM: CPT | Mod: 25 | Performed by: PHYSICIAN ASSISTANT

## 2021-09-21 PROCEDURE — 84702 CHORIONIC GONADOTROPIN TEST: CPT | Performed by: PHYSICIAN ASSISTANT

## 2021-09-21 PROCEDURE — 76801 OB US < 14 WKS SINGLE FETUS: CPT

## 2021-09-21 RX ORDER — CALCIUM CARBONATE 300MG(750)
2 TABLET,CHEWABLE ORAL DAILY
COMMUNITY

## 2021-09-21 ASSESSMENT — ENCOUNTER SYMPTOMS
ABDOMINAL PAIN: 0
BACK PAIN: 1
DYSURIA: 0
VOMITING: 0
FEVER: 0
NAUSEA: 0
CONSTITUTIONAL NEGATIVE: 1
GASTROINTESTINAL NEGATIVE: 1

## 2021-09-21 NOTE — ED TRIAGE NOTES
vaginal bleeding when wiping since 0030 today. 14 wks pregnant, 3rd baby no known complications. Pt will not allow a vaginal exam because of past trauma. She is ok with blood work and an ultrasound.

## 2021-09-21 NOTE — ED NOTES
Pt states she is 14 weeks pregnant. Last night at 0030 she started spotting. Then thia am she started bleeding more and was using a tampon. Pt states by 1300 today the tampon was full of blood and triage line told her to come in.

## 2021-09-21 NOTE — ED PROVIDER NOTES
History     Chief Complaint   Patient presents with     Vaginal Bleeding     vaginal bleeding when wiping since 0030 today. 14 wks pregnant, 3rd baby no known complications     HPI  Ninfa Caicedo is a 35 year old female who presents with complaints of vaginal bleeding since wiping this morning at 12:30 AM.  Pt states she is approximately 14 weeks pregnant with LMP 6/15/2021.  She states she has three children and thinks she may have experienced a miscarriage 6 months ago.  Pt has not had any prenatal care thus far in this pregnancy.  She denies any associated pelvic pain.  Pt complains of lower back pain this morning before she noticed blood upon wiping after urinating.  Her back pain has completely resolved.  Pt has not needed to wear a pad but has noticed blood each time with wiping after urinating today.  Denies fevers, chills, nausea, vomiting, diarrhea, abdominal pain, chest pain, shortness of breath, dysuria, or increased urinary urgency.  No gush of fluid.  Pt is Rh+ according to chart review.      Allergies:  No Known Allergies    Problem List:    Patient Active Problem List    Diagnosis Date Noted     Pyelonephritis 04/08/2016     Priority: Medium     Vaginal delivery 06/10/2014     Priority: Medium     Encounter for supervision of other normal pregnancy 03/17/2014     Priority: Medium     Diagnosis updated by automated process. Provider to review and confirm.       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  Swab negative 3/14  Swab negative 6/14         Radial styloid tenosynovitis 04/12/2005     Priority: Medium        Past Medical History:    Past Medical History:   Diagnosis Date     Allergic rhinitis due to other allergen      Chickenpox      Tendonitis        Past Surgical History:     Past Surgical History:   Procedure Laterality Date     ORTHOPEDIC SURGERY      bilat wrist       Family History:    Family History   Problem Relation Age of Onset     Psychotic Disorder Father         depression     Prostate Cancer Maternal Grandfather      Diabetes Maternal Grandfather      Cardiovascular Paternal Grandfather         pacemaker     Hypertension Mother      Gastrointestinal Disease Maternal Grandfather         Hepatitis C-blood transfusion     Depression Father        Social History:  Marital Status:  Single [1]  Social History     Tobacco Use     Smoking status: Never Smoker     Smokeless tobacco: Never Used   Substance Use Topics     Alcohol use: No     Drug use: No        Medications:    ibuprofen (ADVIL,MOTRIN) 400 MG tablet  Prenatal MV-Min-FA-Omega-3 (PRENATAL GUMMIES/DHA & FA) 0.4-32.5 MG CHEW          Review of Systems   Constitutional: Negative.  Negative for fever.   Gastrointestinal: Negative.  Negative for abdominal pain, nausea and vomiting.   Genitourinary: Positive for vaginal bleeding. Negative for dysuria and pelvic pain.   Musculoskeletal: Positive for back pain.   Skin: Negative.    All other systems reviewed and are negative.      Physical Exam   BP: (!) 144/85  Pulse: 90  Temp: 98  F (36.7  C)  Resp: 20  Weight: 83.9 kg (185 lb)  SpO2: 99 %      Physical Exam  Constitutional:       General: She is not in acute distress.     Appearance: Normal appearance. She is not ill-appearing, toxic-appearing or diaphoretic.   HENT:      Head: Normocephalic and atraumatic.      Nose: Nose normal.      Mouth/Throat:      Mouth: Mucous membranes are moist.   Eyes:      Extraocular Movements: Extraocular movements intact.      Conjunctiva/sclera: Conjunctivae normal.      Pupils: Pupils are equal, round, and reactive to light.   Cardiovascular:      Rate and Rhythm: Normal rate and regular rhythm.      Heart sounds: Normal heart sounds.   Pulmonary:      Effort: Pulmonary effort is normal.       Breath sounds: Normal breath sounds.   Abdominal:      General: There is no distension.      Palpations: Abdomen is soft.      Tenderness: There is no abdominal tenderness. There is no right CVA tenderness, left CVA tenderness or guarding.   Genitourinary:     Comments: Patient declining  Musculoskeletal:         General: Normal range of motion.      Cervical back: Normal range of motion and neck supple.   Skin:     General: Skin is warm and dry.   Neurological:      General: No focal deficit present.      Mental Status: She is alert.         ED Course        Procedures        Results for orders placed or performed during the hospital encounter of 09/21/21 (from the past 24 hour(s))   UA with Microscopic reflex to Culture    Specimen: Urine, Clean Catch   Result Value Ref Range    Color Urine Straw Colorless, Straw, Light Yellow, Yellow    Appearance Urine Clear Clear    Glucose Urine Negative Negative mg/dL    Bilirubin Urine Negative Negative    Ketones Urine Negative Negative mg/dL    Specific Gravity Urine 1.008 1.003 - 1.035    Blood Urine Moderate (A) Negative    pH Urine 6.0 5.0 - 7.0    Protein Albumin Urine Negative Negative mg/dL    Urobilinogen Urine Normal Normal, 2.0 mg/dL    Nitrite Urine Negative Negative    Leukocyte Esterase Urine Trace (A) Negative    Bacteria Urine Many (A) None Seen /HPF    Mucus Urine Present (A) None Seen /LPF    RBC Urine 6 (H) <=2 /HPF    WBC Urine 7 (H) <=5 /HPF    Squamous Epithelials Urine 2 (H) <=1 /HPF    Narrative    Urine Culture not indicated   [place] Draw    Narrative    The following orders were created for panel order [place] Draw.  Procedure                               Abnormality         Status                     ---------                               -----------         ------                     Extra Red Top Tube[[id number]]                               Final result               Extra Green Top (Lithium...[[id number]]                      Final result                Extra Purple Top Tube[[id number]]                            Final result                 Please view results for these tests on the individual orders.   Extra Red Top Tube   Result Value Ref Range    Hold Specimen JIC    Extra Green Top (Lithium Heparin) Tube   Result Value Ref Range    Hold Specimen JIC    HCG quantitative pregnancy   Result Value Ref Range    hCG Quantitative 3,317 (H) 0 - 5 IU/L   Extra Purple Top Tube   Result Value Ref Range    Hold Specimen JIC    CBC with platelets differential    Narrative    The following orders were created for panel order CBC with platelets differential.  Procedure                               Abnormality         Status                     ---------                               -----------         ------                     CBC with platelets and d...[[id number]]                      Final result                 Please view results for these tests on the individual orders.   CBC with platelets and differential   Result Value Ref Range    WBC Count 7.7 4.0 - 11.0 10e3/uL    RBC Count 4.48 3.80 - 5.20 10e6/uL    Hemoglobin 13.4 11.7 - 15.7 g/dL    Hematocrit 40.0 35.0 - 47.0 %    MCV 89 78 - 100 fL    MCH 29.9 26.5 - 33.0 pg    MCHC 33.5 31.5 - 36.5 g/dL    RDW 12.8 10.0 - 15.0 %    Platelet Count 309 150 - 450 10e3/uL    % Neutrophils 71 %    % Lymphocytes 24 %    % Monocytes 4 %    % Eosinophils 1 %    % Basophils 0 %    % Immature Granulocytes 0 %    NRBCs per 100 WBC 0 <1 /100    Absolute Neutrophils 5.4 1.6 - 8.3 10e3/uL    Absolute Lymphocytes 1.9 0.8 - 5.3 10e3/uL    Absolute Monocytes 0.3 0.0 - 1.3 10e3/uL    Absolute Eosinophils 0.1 0.0 - 0.7 10e3/uL    Absolute Basophils 0.0 0.0 - 0.2 10e3/uL    Absolute Immature Granulocytes 0.0 <=0.0 10e3/uL    Absolute NRBCs 0.0 10e3/uL   US OB < 14 Weeks Single    Narrative    EXAM: US OB < 14 WEEKS SINGLE  LOCATION: [facility]  DATE/TIME: 9/21/2021 4:52 PM    INDICATION: Vaginal  bleeding, LMP 6/15/2021.  COMPARISON: None.  TECHNIQUE: Transabdominal scans were performed. Endovaginal ultrasound was performed to better visualize the embryo.    FINDINGS:  UTERUS: Single normal appearing intrauterine gestation sac.  CRL: Measures 2.3 cm, equals 9 weeks 0 days.  FETAL HEART RATE: 0 bpm.  AMNIOTIC FLUID: Normal.  PLACENTA: Probable subchronic hemorrhages as evidenced by areas of hypoechoic change around the gestational sac.    RIGHT OVARY: Normal.  LEFT OVARY: Normal.      Impression    IMPRESSION:   Single intrauterine gestation at 9 weeks 0 days, no cardiac activity demonstrated compatible with fetal demise.       Medications - No data to display    Assessments & Plan (with Medical Decision Making)     Pt is a 35 year old female who presents with complaints of vaginal bleeding since wiping this morning at 12:30 AM.  Pt states she is approximately 14 weeks pregnant with LMP 6/15/2021.  She states she has three children and thinks she may have experienced a miscarriage 6 months ago.  Pt has not had any prenatal care thus far in this pregnancy.  She denies any associated pelvic pain.  Pt complains of lower back pain this morning before she noticed blood upon wiping after urinating.  Her back pain has completely resolved.  Pt has not needed to wear a pad but has noticed blood each time with wiping after urinating today.      Pt is afebrile on arrival.  Exam as above.  Pt is not hypotensive or tachycardic.  No significant vaginal bleeding per her report.  Pt not allowing pelvic exam due to past trauma.  No reported pelvic or abdominal pain.  Hemoglobin is normal at 13.4.  Her Rh status is positive per chart reviewed.  UA not concerning for infection.  Ultrasound shows single IUP measuring 9 weeks 0 days with no cardiac activity demonstrated compatible with fetal demise.  Discussed results with patient.  Discussed with OB/GYN, Dr. Marx, and further relayed options with patient including expectant  management, medical treatment (Misoprostol) or surgical treatment (D&C).  Pt prefers expectant management at this time.  Discussed return precautions.  Hand-outs were provided.    Patient was instructed to follow-up with PCP in 3-5 days for continued care and management or sooner if new or worsening symptoms.  She is to return to the ED for persistent and/or worsening symptoms.  Patient expressed understanding of the diagnosis and plan and was discharged home in good condition.    I have reviewed the nursing notes.    I have reviewed the findings, diagnosis, plan and need for follow up with the patient.    Discharge Medication List as of 9/21/2021  6:14 PM          Final diagnoses:   Incomplete miscarriage       9/21/2021   [facility] EMERGENCY DEPT      Disclaimer:  This note consists of symbols derived from keyboarding, dictation and/or voice recognition software.  As a result, there may be errors in the script that have gone undetected.  Please consider this when interpreting information found in this chart.     Deloris Wright PA-C  09/21/21 [id number]

## 2021-09-22 ENCOUNTER — TELEPHONE (OUTPATIENT)
Dept: EMERGENCY MEDICINE | Facility: CLINIC | Age: 36
End: 2021-09-22

## 2021-09-22 DIAGNOSIS — N30.00 ACUTE CYSTITIS WITHOUT HEMATURIA: ICD-10-CM

## 2021-09-22 NOTE — TELEPHONE ENCOUNTER
[facility]    [place] ED lab result protocol used  Urine culture    Reason for call  Notify of lab results, assess symptoms,  review ED providers recommendations/discharge instructions (if necessary) and advise per ED lab result f/u protocol    Lab Result (including Rx patient on, if applicable)  Preliminary urine culture report on 9/22/21 shows the presence of bacteria(s):  >100,000 CFU/mL Escherichia coli  Emergency Dept/Urgent Care discharge antibiotic: None  Recommendations per [facility] ED Lab result Urine culture protocol.  Information table from Emergency Dept Provider visit on 9/21/21  Symptoms reported at ED visit (Chief complaint, HPI) Vaginal Bleeding        vaginal bleeding when wiping since 0030 today. 14 wks pregnant, 3rd baby no known complications      HPI  Ninfa Caicedo is a 35 year old female who presents with complaints of vaginal bleeding since wiping this morning at 12:30 AM.  Pt states she is approximately 14 weeks pregnant with LMP 6/15/2021.  She states she has three children and thinks she may have experienced a miscarriage 6 months ago.  Pt has not had any prenatal care thus far in this pregnancy.  She denies any associated pelvic pain.  Pt complains of lower back pain this morning before she noticed blood upon wiping after urinating.  Her back pain has completely resolved.  Pt has not needed to wear a pad but has noticed blood each time with wiping after urinating today.  Denies fevers, chills, nausea, vomiting, diarrhea, abdominal pain, chest pain, shortness of breath, dysuria, or increased urinary urgency.  No gush of fluid.  Pt is Rh+ according to chart review.     Significant Medical hx, if applicable (i.e. CKD, diabetes) Reviewed   Allergies No Known Allergies   Weight, if applicable Wt Readings from Last 2 Encounters:   09/21/21 83.9 kg (185 lb)   04/01/19 81.6 kg (180 lb)      Coumadin/Warfarin  [Yes /No] No   Creatinine Level (mg/dl) Creatinine   Date Value Ref Range Status   04/08/2016 0.94 0.52 - 1.04 mg/dL Final      Creatinine clearance (ml/min), if applicable Creatinine clearance cannot be calculated (Patient's most recent lab result is older than the maximum 30 days allowed.)   Pregnant (Yes/No/NA)  Incomplete miscarriage   Breastfeeding (Yes/No/NA) ?   ED providers Impression and Plan (applicable information) Pt is a 35 year old female who presents with complaints of vaginal bleeding since wiping this morning at 12:30 AM.  Pt states she is approximately 14 weeks pregnant with LMP 6/15/2021.  She states she has three children and thinks she may have experienced a miscarriage 6 months ago.  Pt has not had any prenatal care thus far in this pregnancy.  She denies any associated pelvic pain.  Pt complains of lower back pain this morning before she noticed blood upon wiping after urinating.  Her back pain has completely resolved.  Pt has not needed to wear a pad but has noticed blood each time with wiping after urinating today.       Pt is afebrile on arrival.  Exam as above.  Pt is not hypotensive or tachycardic.  No significant vaginal bleeding per her report.  Pt not allowing pelvic exam due to past trauma.  No reported pelvic or abdominal pain.  Hemoglobin is normal at 13.4.  Her Rh status is positive per chart reviewed.  UA not concerning for infection.  Ultrasound shows single IUP measuring 9 weeks 0 days with no cardiac activity demonstrated compatible with fetal demise.  Discussed results with patient.  Discussed with OB/GYN, Dr. Marx, and further relayed options with patient including expectant management, medical treatment (Misoprostol) or surgical treatment (D&C).  Pt prefers expectant management at this time.  Discussed return precautions.  Hand-outs were provided.     Patient was instructed to follow-up with PCP in 3-5 days for continued care and management or sooner if new or worsening  symptoms.  She is to return to the ED for persistent and/or worsening symptoms.  Patient expressed understanding of the diagnosis and plan and was discharged home in good condition.   ED diagnosis Incomplete miscarriage   ED provider Deloris Wright PA-C      RN Assessment (Patient s current Symptoms), include time called.  [Insert Left message here if message left]  1:37PM: Left voicemail message requesting a call back to [facility] ED Lab Result RN at [phone].  RN is available every day between 9 a.m. and 5:30 p.m    Joanne Marcum RN  [facility]  Emergency Dept Lab Result RN  Ph# [phone]     Copy of Lab result   Urine Culture  Order: [id number]  Collected:  9/21/2021  2:52 PM Status:  Preliminary result   Visible to patient:  No (not released)  Specimen Information: Urine, Clean Catch         0 Result Notes  Culture >100,000 CFU/mL Escherichia coliAbnormal             Resulting Agency: [facility]           Specimen Collected: 09/21/21  2:52 PM Last Resulted: 09/22/21  1:20 PM

## 2021-09-23 LAB — BACTERIA UR CULT: ABNORMAL

## 2021-09-23 RX ORDER — CEPHALEXIN 500 MG/1
500 CAPSULE ORAL 4 TIMES DAILY
Qty: 16 CAPSULE | Refills: 0 | Status: SHIPPED | OUTPATIENT
Start: 2021-09-23 | End: 2021-09-27

## 2021-09-23 NOTE — RESULT ENCOUNTER NOTE
Final urine culture on 9/23/21 shows the presence of bacteria(s): >100,000 CFU/mL Escherichia coli  [facility] Emergency Dept discharge antibiotic: None  Recommendations in treatment per [facility] ED lab result Urine Culture protocol.

## 2021-09-23 NOTE — TELEPHONE ENCOUNTER
[facility] result notification     Patient/parent Name  Ninfa    JUNE Assessment (Patient s current Symptoms), include time called.  [Insert Left message here if message left]  Pt says she has continuing abdominal pain since ED visit and has been talking to a nurse/clinic about her situation. No worsening symptoms and says she never has symptoms with UTI's.  Lab result (if applicable):  Final urine culture on 9/23/21 shows the presence of bacteria(s): >100,000 CFU/mL Escherichia coli  [facility] Emergency Dept discharge antibiotic: None  Recommendations in treatment per [facility] ED lab result Urine Culture protocol.    RN Recommendations/Instructions per [place] ED lab result protocol  Patient notified of lab result and treatment recommendations.  Rx for Cephalexin (Keflex) 500 mg capsule, 1 capsule (500 mg) by mouth QID times daily for 4 days. Sent to [Pharmacy - [facility] in [place]].  RN Advised to finish full course of antibiotics as prescribed and drink plenty of fluids. Eat yogurt, cottage cheese or take probiotics as needed. And follow up with your PCP as the ED provider recommended. To call her PCP or OB clinic tomorrow with this additional positive test and let them know she still is having abd discomfort. Come back to the ED anytime for evaluation. Patient voices understanding and is in agreement with this plan.      Please Contact your PCP clinic or return to the Emergency department if your:    Symptoms return.    Symptoms do not improve after 3 days on antibiotic.    Symptoms do not resolve after completing antibiotic.    Symptoms worsen or other concerning symptom's.    Hortencia Sweeney RN  [facility]  Emergency Dept Lab Result RN  Ph# [phone]

## 2021-09-23 NOTE — RESULT ENCOUNTER NOTE
Left voicemail message requesting a call back to [facility] ED Lab Result RN at [phone].  RN is available every day between 9 a.m. and 5:30 p.m.  See Telephone encounter.

## 2022-01-02 ENCOUNTER — HEALTH MAINTENANCE LETTER (OUTPATIENT)
Age: 37
End: 2022-01-02

## 2022-11-19 ENCOUNTER — HEALTH MAINTENANCE LETTER (OUTPATIENT)
Age: 37
End: 2022-11-19

## 2023-04-09 ENCOUNTER — HEALTH MAINTENANCE LETTER (OUTPATIENT)
Age: 38
End: 2023-04-09

## 2023-08-05 ENCOUNTER — HOSPITAL ENCOUNTER (EMERGENCY)
Facility: CLINIC | Age: 38
Discharge: HOME | End: 2023-08-05
Attending: FAMILY MEDICINE | Admitting: FAMILY MEDICINE
Payer: COMMERCIAL

## 2023-08-05 ENCOUNTER — APPOINTMENT (OUTPATIENT)
Dept: GENERAL RADIOLOGY | Facility: CLINIC | Age: 38
End: 2023-08-05
Attending: FAMILY MEDICINE
Payer: COMMERCIAL

## 2023-08-05 VITALS
OXYGEN SATURATION: 98 % | HEART RATE: 89 BPM | BODY MASS INDEX: 31.18 KG/M2 | WEIGHT: 193.2 LBS | TEMPERATURE: 97.6 F | RESPIRATION RATE: 16 BRPM | SYSTOLIC BLOOD PRESSURE: 112 MMHG | DIASTOLIC BLOOD PRESSURE: 96 MMHG

## 2023-08-05 DIAGNOSIS — R10.13 EPIGASTRIC PAIN: ICD-10-CM

## 2023-08-05 LAB
ALBUMIN SERPL BCG-MCNC: 4.3 G/DL (ref 3.5–5.2)
ALP SERPL-CCNC: 68 U/L (ref 35–104)
ALT SERPL W P-5'-P-CCNC: 25 U/L (ref 0–50)
ANION GAP SERPL CALCULATED.3IONS-SCNC: 13 MMOL/L (ref 7–15)
AST SERPL W P-5'-P-CCNC: 22 U/L (ref 0–45)
BASOPHILS # BLD AUTO: 0 10E3/UL (ref 0–0.2)
BASOPHILS NFR BLD AUTO: 1 %
BILIRUB SERPL-MCNC: 0.2 MG/DL
BUN SERPL-MCNC: 10.5 MG/DL (ref 6–20)
CALCIUM SERPL-MCNC: 10.1 MG/DL (ref 8.6–10)
CHLORIDE SERPL-SCNC: 104 MMOL/L (ref 98–107)
CREAT SERPL-MCNC: 0.97 MG/DL (ref 0.51–0.95)
D DIMER PPP FEU-MCNC: 0.31 UG/ML FEU (ref 0–0.5)
DEPRECATED HCO3 PLAS-SCNC: 23 MMOL/L (ref 22–29)
EOSINOPHIL # BLD AUTO: 0.3 10E3/UL (ref 0–0.7)
EOSINOPHIL NFR BLD AUTO: 4 %
ERYTHROCYTE [DISTWIDTH] IN BLOOD BY AUTOMATED COUNT: 12.5 % (ref 10–15)
GFR SERPL CREATININE-BSD FRML MDRD: 77 ML/MIN/1.73M2
GLUCOSE SERPL-MCNC: 95 MG/DL (ref 70–99)
HCG SERPL QL: NEGATIVE
HCT VFR BLD AUTO: 40.2 % (ref 35–47)
HGB BLD-MCNC: 13.8 G/DL (ref 11.7–15.7)
IMM GRANULOCYTES # BLD: 0 10E3/UL
IMM GRANULOCYTES NFR BLD: 0 %
LIPASE SERPL-CCNC: 38 U/L (ref 13–60)
LYMPHOCYTES # BLD AUTO: 2.1 10E3/UL (ref 0.8–5.3)
LYMPHOCYTES NFR BLD AUTO: 34 %
MCH RBC QN AUTO: 29.7 PG (ref 26.5–33)
MCHC RBC AUTO-ENTMCNC: 34.3 G/DL (ref 31.5–36.5)
MCV RBC AUTO: 87 FL (ref 78–100)
MONOCYTES # BLD AUTO: 0.4 10E3/UL (ref 0–1.3)
MONOCYTES NFR BLD AUTO: 7 %
NEUTROPHILS # BLD AUTO: 3.4 10E3/UL (ref 1.6–8.3)
NEUTROPHILS NFR BLD AUTO: 54 %
NRBC # BLD AUTO: 0 10E3/UL
NRBC BLD AUTO-RTO: 0 /100
PLATELET # BLD AUTO: 311 10E3/UL (ref 150–450)
POTASSIUM SERPL-SCNC: 3.7 MMOL/L (ref 3.4–5.3)
PROT SERPL-MCNC: 7.5 G/DL (ref 6.4–8.3)
RBC # BLD AUTO: 4.64 10E6/UL (ref 3.8–5.2)
SODIUM SERPL-SCNC: 140 MMOL/L (ref 136–145)
WBC # BLD AUTO: 6.2 10E3/UL (ref 4–11)

## 2023-08-05 PROCEDURE — 96374 THER/PROPH/DIAG INJ IV PUSH: CPT

## 2023-08-05 PROCEDURE — 250N000011 HC RX IP 250 OP 636: Mod: JZ | Performed by: FAMILY MEDICINE

## 2023-08-05 PROCEDURE — 76705 ECHO EXAM OF ABDOMEN: CPT

## 2023-08-05 PROCEDURE — 99284 EMERGENCY DEPT VISIT MOD MDM: CPT | Mod: 25 | Performed by: FAMILY MEDICINE

## 2023-08-05 PROCEDURE — 93005 ELECTROCARDIOGRAM TRACING: CPT

## 2023-08-05 PROCEDURE — 71046 X-RAY EXAM CHEST 2 VIEWS: CPT

## 2023-08-05 PROCEDURE — 80053 COMPREHEN METABOLIC PANEL: CPT | Performed by: FAMILY MEDICINE

## 2023-08-05 PROCEDURE — 96375 TX/PRO/DX INJ NEW DRUG ADDON: CPT

## 2023-08-05 PROCEDURE — 85379 FIBRIN DEGRADATION QUANT: CPT | Performed by: FAMILY MEDICINE

## 2023-08-05 PROCEDURE — 83690 ASSAY OF LIPASE: CPT | Performed by: FAMILY MEDICINE

## 2023-08-05 PROCEDURE — 84703 CHORIONIC GONADOTROPIN ASSAY: CPT | Performed by: FAMILY MEDICINE

## 2023-08-05 PROCEDURE — 250N000013 HC RX MED GY IP 250 OP 250 PS 637: Performed by: FAMILY MEDICINE

## 2023-08-05 PROCEDURE — 258N000003 HC RX IP 258 OP 636: Performed by: FAMILY MEDICINE

## 2023-08-05 PROCEDURE — 93010 ELECTROCARDIOGRAM REPORT: CPT | Performed by: FAMILY MEDICINE

## 2023-08-05 PROCEDURE — 85025 COMPLETE CBC W/AUTO DIFF WBC: CPT | Performed by: FAMILY MEDICINE

## 2023-08-05 PROCEDURE — C9113 INJ PANTOPRAZOLE SODIUM, VIA: HCPCS | Mod: JZ | Performed by: FAMILY MEDICINE

## 2023-08-05 PROCEDURE — 36415 COLL VENOUS BLD VENIPUNCTURE: CPT | Performed by: FAMILY MEDICINE

## 2023-08-05 PROCEDURE — 76705 ECHO EXAM OF ABDOMEN: CPT | Mod: 26 | Performed by: FAMILY MEDICINE

## 2023-08-05 PROCEDURE — 99285 EMERGENCY DEPT VISIT HI MDM: CPT | Mod: 25

## 2023-08-05 RX ORDER — SUCRALFATE 1 G/1
1 TABLET ORAL 4 TIMES DAILY
Qty: 30 TABLET | Refills: 0 | Status: SHIPPED | OUTPATIENT
Start: 2023-08-05

## 2023-08-05 RX ORDER — SUCRALFATE ORAL 1 G/10ML
1 SUSPENSION ORAL ONCE
Status: COMPLETED | OUTPATIENT
Start: 2023-08-05 | End: 2023-08-05

## 2023-08-05 RX ADMIN — SUCRALFATE 1 G: 1 SUSPENSION ORAL at 16:57

## 2023-08-05 RX ADMIN — PANTOPRAZOLE SODIUM 40 MG: 40 INJECTION, POWDER, LYOPHILIZED, FOR SOLUTION INTRAVENOUS at 16:57

## 2023-08-05 RX ADMIN — METOCLOPRAMIDE HYDROCHLORIDE 10 MG: 5 INJECTION INTRAMUSCULAR; INTRAVENOUS at 17:01

## 2023-08-05 ASSESSMENT — ENCOUNTER SYMPTOMS
FEVER: 0
HEADACHES: 0
ABDOMINAL PAIN: 1
COUGH: 0
SORE THROAT: 0
VOMITING: 0
DIARRHEA: 0
DIAPHORESIS: 0
SHORTNESS OF BREATH: 1
NAUSEA: 0
BLOOD IN STOOL: 0
DYSURIA: 0
CHILLS: 0
PALPITATIONS: 0
SINUS PRESSURE: 0
WHEEZING: 0
FREQUENCY: 0
CONSTIPATION: 0

## 2023-08-05 ASSESSMENT — ACTIVITIES OF DAILY LIVING (ADL): ADLS_ACUITY_SCORE: 35

## 2023-08-05 NOTE — ED PROVIDER NOTES
History     Chief Complaint   Patient presents with     Abdominal Pain     HPI  Ninfa Caicedo is a 37 year old female who presents with history of prior pyelonephritis,  miscarriage in the fall of 2022, prior MRSA infections.  She tells me that she has been experiencing moderate to severe epigastric pain onset after she had completed a softball game midweek.  She had a pain that had onset in the epigastrium without significant radiation and feels as if she needs to burp.  She has difficulty taking deep breaths because of this.  She has no radiation of the pain to the back jaw chest.  There is no associated nausea or vomiting and she is able to eat and drink.  Not made worse with food.  Pleuritic in nature.  No diarrhea or constipation.   She uses no excessive alcohol.  No tobacco.  No ibuprofen Advil Motrin that significant.  No blood in the stool black tarry stools.  No associated fever.    Denies recent prolonged travel (>3 hours) by car or plane, history or FHx of venous thromboembolism, recent surgery (last 4 weeks), active cancer history, hypercoagulable state, estrogen or other medications/conditions causing VTE or  new unilateral swelling or pain in the legs or calves.      Allergies:  No Known Allergies    Problem List:    Patient Active Problem List    Diagnosis Date Noted     Pyelonephritis 04/08/2016     Priority: Medium     Vaginal delivery 06/10/2014     Priority: Medium     Encounter for supervision of other normal pregnancy 03/17/2014     Priority: Medium     Diagnosis updated by automated process. Provider to review and confirm.       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  Swab negative 3/14  Swab negative 6/14         Radial styloid tenosynovitis 04/12/2005     Priority:  Medium        Past Medical History:    Past Medical History:   Diagnosis Date     Allergic rhinitis due to other allergen      Chickenpox      Tendonitis        Past Surgical History:    Past Surgical History:   Procedure Laterality Date     ORTHOPEDIC SURGERY      bilat wrist       Family History:    Family History   Problem Relation Age of Onset     Psychotic Disorder Father         depression     Prostate Cancer Maternal Grandfather      Diabetes Maternal Grandfather      Cardiovascular Paternal Grandfather         pacemaker     Hypertension Mother      Gastrointestinal Disease Maternal Grandfather         Hepatitis C-blood transfusion     Depression Father        Social History:  Marital Status:  Single [1]  Social History     Tobacco Use     Smoking status: Never     Smokeless tobacco: Never   Substance Use Topics     Alcohol use: No     Drug use: No        Medications:    ibuprofen (ADVIL,MOTRIN) 400 MG tablet  Prenatal MV-Min-FA-Omega-3 (PRENATAL GUMMIES/DHA & FA) 0.4-32.5 MG CHEW          Review of Systems   Constitutional:  Negative for chills, diaphoresis and fever.   HENT:  Negative for ear pain, sinus pressure and sore throat.    Eyes:  Negative for visual disturbance.   Respiratory:  Positive for shortness of breath. Negative for cough and wheezing.    Cardiovascular:  Negative for chest pain and palpitations.   Gastrointestinal:  Positive for abdominal pain. Negative for blood in stool, constipation, diarrhea, nausea and vomiting.   Genitourinary:  Negative for dysuria, frequency and urgency.   Skin:  Negative for rash.   Neurological:  Negative for headaches.   All other systems reviewed and are negative.      Physical Exam   BP: (!) 154/90  Pulse: 96  Temp: 97.6  F (36.4  C)  Resp: 16  Weight: 87.6 kg (193 lb 3.2 oz)  SpO2: 100 %      Physical Exam  Constitutional:       General: She is in acute distress.      Appearance: She is not diaphoretic.   Eyes:      Conjunctiva/sclera: Conjunctivae normal.    Cardiovascular:      Rate and Rhythm: Normal rate and regular rhythm.      Heart sounds: No murmur heard.  Pulmonary:      Effort: Pulmonary effort is normal. No respiratory distress.      Breath sounds: No stridor. No wheezing or rhonchi.   Abdominal:      General: Abdomen is flat. There is no distension.      Palpations: Abdomen is soft. There is no mass.      Tenderness: There is abdominal tenderness in the epigastric area. There is no guarding.   Musculoskeletal:      Cervical back: Neck supple.      Right lower leg: No edema.      Left lower leg: No edema.   Skin:     Coloration: Skin is not pale.      Findings: No rash.   Neurological:      General: No focal deficit present.      Mental Status: She is alert.         ED Course                 Procedures                EKG Interpretation:      Interpreted by Vivek Desir MD  EKG done at 1718 hrs. demonstrates a sinus rhythm with 65 bpm normal axis.  No ST change.  There is T wave flattening in several leads V2 V3 lead III.  There is normal R progression.  No Q waves.  Normal intervals.  Normal conduction.  No ectopy.  Impression sinus rhythm 65 bpm no significant acute changes.    Critical Care time:  none               Results for orders placed or performed during the hospital encounter of 08/05/23 (from the past 24 hour(s))   POC US ABDOMEN LIMITED    Impression    [facility] Procedure Note      Limited Bedside ED Gallbladder  Ultrasound:    PROCEDURE: PERFORMED BY: Dr. Vivek Desir MD  INDICATIONS:  RUQ/Epigastric Pain  PROBE:  Low frequency convex probe  BODY LOCATION: Abdomen  FINDINGS:   An ultrasound of the gallbladder was performed using longitudinal views.  Gallstone(s):  Absent  Gallbladder sludge:  Absent  Sonographic Vick's sign:  Absent  Gallbladder wall thickening (greater than 4 mm):  Absent  Pericholecystic fluid: Absent  Common bile duct (dilated if internal diameter greater than 6 mm): not dilated  INTERPRETATION:  The  gallbladder evaluation is normal with no gallstones/sludge, no sonographic Vick's sign, no GB wall thickening, no pericholecystic fluid, and without evidence of cholelithiasis or cholecystitis.  IMAGE DOCUMENTATION: Images were archived to PACs system.       CBC with platelets differential    Narrative    The following orders were created for panel order CBC with platelets differential.  Procedure                               Abnormality         Status                     ---------                               -----------         ------                     CBC with platelets and d...[[id number]]                      Final result                 Please view results for these tests on the individual orders.   Comprehensive metabolic panel   Result Value Ref Range    Sodium 140 136 - 145 mmol/L    Potassium 3.7 3.4 - 5.3 mmol/L    Chloride 104 98 - 107 mmol/L    Carbon Dioxide (CO2) 23 22 - 29 mmol/L    Anion Gap 13 7 - 15 mmol/L    Urea Nitrogen 10.5 6.0 - 20.0 mg/dL    Creatinine 0.97 (H) 0.51 - 0.95 mg/dL    Calcium 10.1 (H) 8.6 - 10.0 mg/dL    Glucose 95 70 - 99 mg/dL    Alkaline Phosphatase 68 35 - 104 U/L    AST 22 0 - 45 U/L    ALT 25 0 - 50 U/L    Protein Total 7.5 6.4 - 8.3 g/dL    Albumin 4.3 3.5 - 5.2 g/dL    Bilirubin Total 0.2 <=1.2 mg/dL    GFR Estimate 77 >60 mL/min/1.73m2   D dimer quantitative   Result Value Ref Range    D-Dimer Quantitative 0.31 0.00 - 0.50 ug/mL FEU    Narrative    This D-dimer assay is intended for use in conjunction with a clinical pretest probability assessment model to exclude pulmonary embolism (PE) and deep venous thrombosis (DVT) in outpatients suspected of PE or DVT. The cut-off value is 0.50 ug/mL FEU.   Lipase   Result Value Ref Range    Lipase 38 13 - 60 U/L   CBC with platelets and differential   Result Value Ref Range    WBC Count 6.2 4.0 - 11.0 10e3/uL    RBC Count 4.64 3.80 - 5.20 10e6/uL    Hemoglobin 13.8 11.7 - 15.7 g/dL    Hematocrit 40.2 35.0 - 47.0 %    MCV 87  78 - 100 fL    MCH 29.7 26.5 - 33.0 pg    MCHC 34.3 31.5 - 36.5 g/dL    RDW 12.5 10.0 - 15.0 %    Platelet Count 311 150 - 450 10e3/uL    % Neutrophils 54 %    % Lymphocytes 34 %    % Monocytes 7 %    % Eosinophils 4 %    % Basophils 1 %    % Immature Granulocytes 0 %    NRBCs per 100 WBC 0 <1 /100    Absolute Neutrophils 3.4 1.6 - 8.3 10e3/uL    Absolute Lymphocytes 2.1 0.8 - 5.3 10e3/uL    Absolute Monocytes 0.4 0.0 - 1.3 10e3/uL    Absolute Eosinophils 0.3 0.0 - 0.7 10e3/uL    Absolute Basophils 0.0 0.0 - 0.2 10e3/uL    Absolute Immature Granulocytes 0.0 <=0.4 10e3/uL    Absolute NRBCs 0.0 10e3/uL   HCG qualitative pregnancy (blood)   Result Value Ref Range    hCG Serum Qualitative Negative Negative   Chest XR,  PA & LAT    Narrative    EXAM: XR CHEST 2 VIEWS  LOCATION: [facility]  DATE: 8/5/2023    INDICATION: epigastric pain   svere  COMPARISON: None.      Impression    IMPRESSION: Negative chest.       Medications   metoclopramide (REGLAN) 10 mg in sodium chloride 0.9 % 50 mL infusion (has no administration in time range)   sucralfate (CARAFATE) suspension 1 g (has no administration in time range)   pantoprazole (PROTONIX) IV push injection 40 mg (has no administration in time range)       Assessments & Plan (with Medical Decision Making)     MDM; Ninfa OSORIO Marifer is a 37 year old female presents with abdominal pain epigastrium without radiation.  Associated shortness of breath difficult to take a deep breath.  Moderate pain upper abdomen.  No prior abdominal surgery.  No urinary or stool changes.  No trauma.  Happened after she had played softball in was taking a shower at the time.  No swelling in this area.  Denies current pregnancy.  No overuse at work.    We will start with laboratory testing Protonix Reglan sucralfate.  EKG and D-dimer also performed as well as chest x-ray.  More suspicious for GI cause but there is a pleuritic component to this.  She has no known VTE risk.   But she was tachycardic on arrival here.  If D-dimer is negative and other testing nondiagnostic, therapeutic medications ineffective, will consider pursuit of CT of the abdomen and pelvis.  My bedside ultrasound is negative for any changes in the gallbladder to suggest an acute cholecystitis and she has no obvious sonographic Vick sign.      Patient had relief with the combination of sucralfate, Reglan and Protonix.  She notes significant improvement.  We discussed possible etiologies.  Management as below.  Precautions given for return.  At this point no indication for further imaging.    I have reviewed the nursing notes.    I have reviewed the findings, diagnosis, plan and need for follow up with the patient.           Medical Decision Making  The patient's presentation was of moderate complexity (an acute illness with systemic symptoms).    The patient's evaluation involved:  ordering and/or review of 3+ test(s) in this encounter (see separate area of note for details)    The patient's management necessitated moderate risk (prescription drug management including medications given in the ED).        Discharge Medication List as of 8/5/2023  5:53 PM      START taking these medications    Details   sucralfate (CARAFATE) 1 GM tablet Take 1 tablet (1 g) by mouth 4 times daily, Disp-30 tablet, R-0, E-Prescribe             Final diagnoses:   Epigastric pain - start with prilosec 20 mg orally daily.  maalox or mylanta a sneeded - sucralfate if not effective.  no food 2 hours before bed. avoiding iburpofen, caffeine.  64 oz fluid per day.        8/5/2023   [facility] EMERGENCY DEPT       Vivek Desir MD  08/05/23 2018

## 2023-08-05 NOTE — ED TRIAGE NOTES
Epigastric pain x 3 days. Took antacid and it felt better but then the pain came back. Denies v/n/d or constipation. No fever. States it feels like she needs to burp and can't

## 2023-08-05 NOTE — DISCHARGE INSTRUCTIONS
ICD-10-CM    1. Epigastric pain  R10.13 Primary Care Referral    start with prilosec 20 mg orally daily.  maalox or mylanta a sneeded - sucralfate if not effective.  no food 2 hours before bed. avoiding iburpofen, caffeine.  64 oz fluid per day.

## 2023-10-04 ENCOUNTER — HOSPITAL ENCOUNTER (EMERGENCY)
Facility: CLINIC | Age: 38
Discharge: HOME | End: 2023-10-04
Admitting: EMERGENCY MEDICINE
Payer: COMMERCIAL

## 2023-10-04 VITALS
OXYGEN SATURATION: 99 % | HEART RATE: 92 BPM | DIASTOLIC BLOOD PRESSURE: 103 MMHG | HEIGHT: 66 IN | WEIGHT: 185 LBS | BODY MASS INDEX: 29.73 KG/M2 | TEMPERATURE: 98.8 F | SYSTOLIC BLOOD PRESSURE: 159 MMHG | RESPIRATION RATE: 18 BRPM

## 2023-10-04 PROCEDURE — 99281 EMR DPT VST MAYX REQ PHY/QHP: CPT | Performed by: EMERGENCY MEDICINE

## 2023-10-04 NOTE — ED TRIAGE NOTES
Pt reports pain to left side of head that radiates down to base of skull, pt reports pain has been going on for the past 72 hours. Pt reports has been taking ibuprofen and tylenol with some relief. Pt also using cold pack with relief

## 2023-10-05 ENCOUNTER — HOSPITAL ENCOUNTER (EMERGENCY)
Facility: CLINIC | Age: 38
Discharge: HOME | End: 2023-10-05
Attending: FAMILY MEDICINE | Admitting: FAMILY MEDICINE
Payer: COMMERCIAL

## 2023-10-05 VITALS
DIASTOLIC BLOOD PRESSURE: 84 MMHG | SYSTOLIC BLOOD PRESSURE: 131 MMHG | BODY MASS INDEX: 28.93 KG/M2 | HEIGHT: 66 IN | OXYGEN SATURATION: 99 % | RESPIRATION RATE: 18 BRPM | WEIGHT: 180 LBS | HEART RATE: 86 BPM | TEMPERATURE: 98.9 F

## 2023-10-05 DIAGNOSIS — G43.009 MIGRAINE WITHOUT AURA AND WITHOUT STATUS MIGRAINOSUS, NOT INTRACTABLE: ICD-10-CM

## 2023-10-05 LAB
ANION GAP SERPL CALCULATED.3IONS-SCNC: 11 MMOL/L (ref 7–15)
BASO+EOS+MONOS # BLD AUTO: NORMAL 10*3/UL
BASO+EOS+MONOS NFR BLD AUTO: NORMAL %
BASOPHILS # BLD AUTO: 0 10E3/UL (ref 0–0.2)
BASOPHILS NFR BLD AUTO: 0 %
BUN SERPL-MCNC: 13 MG/DL (ref 6–20)
CALCIUM SERPL-MCNC: 9.3 MG/DL (ref 8.6–10)
CHLORIDE SERPL-SCNC: 107 MMOL/L (ref 98–107)
CREAT SERPL-MCNC: 1.01 MG/DL (ref 0.51–0.95)
CRP SERPL-MCNC: 4.18 MG/L
DEPRECATED HCO3 PLAS-SCNC: 21 MMOL/L (ref 22–29)
EGFRCR SERPLBLD CKD-EPI 2021: 73 ML/MIN/1.73M2
EOSINOPHIL # BLD AUTO: 0.2 10E3/UL (ref 0–0.7)
EOSINOPHIL NFR BLD AUTO: 4 %
ERYTHROCYTE [DISTWIDTH] IN BLOOD BY AUTOMATED COUNT: 13.2 % (ref 10–15)
ERYTHROCYTE [SEDIMENTATION RATE] IN BLOOD BY WESTERGREN METHOD: 10 MM/HR (ref 0–20)
GLUCOSE SERPL-MCNC: 94 MG/DL (ref 70–99)
HCT VFR BLD AUTO: 36.7 % (ref 35–47)
HGB BLD-MCNC: 12.1 G/DL (ref 11.7–15.7)
IMM GRANULOCYTES # BLD: 0 10E3/UL
IMM GRANULOCYTES NFR BLD: 0 %
LYMPHOCYTES # BLD AUTO: 1.7 10E3/UL (ref 0.8–5.3)
LYMPHOCYTES NFR BLD AUTO: 39 %
MCH RBC QN AUTO: 30 PG (ref 26.5–33)
MCHC RBC AUTO-ENTMCNC: 33 G/DL (ref 31.5–36.5)
MCV RBC AUTO: 91 FL (ref 78–100)
MONOCYTES # BLD AUTO: 0.2 10E3/UL (ref 0–1.3)
MONOCYTES NFR BLD AUTO: 5 %
NEUTROPHILS # BLD AUTO: 2.3 10E3/UL (ref 1.6–8.3)
NEUTROPHILS NFR BLD AUTO: 52 %
NRBC # BLD AUTO: 0 10E3/UL
NRBC BLD AUTO-RTO: 0 /100
PLATELET # BLD AUTO: 305 10E3/UL (ref 150–450)
POTASSIUM SERPL-SCNC: 4.2 MMOL/L (ref 3.4–5.3)
RBC # BLD AUTO: 4.03 10E6/UL (ref 3.8–5.2)
SODIUM SERPL-SCNC: 139 MMOL/L (ref 135–145)
WBC # BLD AUTO: 4.5 10E3/UL (ref 4–11)

## 2023-10-05 PROCEDURE — 96375 TX/PRO/DX INJ NEW DRUG ADDON: CPT | Performed by: FAMILY MEDICINE

## 2023-10-05 PROCEDURE — 80048 BASIC METABOLIC PNL TOTAL CA: CPT | Performed by: FAMILY MEDICINE

## 2023-10-05 PROCEDURE — 96361 HYDRATE IV INFUSION ADD-ON: CPT | Performed by: FAMILY MEDICINE

## 2023-10-05 PROCEDURE — 85025 COMPLETE CBC W/AUTO DIFF WBC: CPT | Performed by: FAMILY MEDICINE

## 2023-10-05 PROCEDURE — 250N000011 HC RX IP 250 OP 636: Mod: JZ | Performed by: FAMILY MEDICINE

## 2023-10-05 PROCEDURE — 36415 COLL VENOUS BLD VENIPUNCTURE: CPT | Performed by: FAMILY MEDICINE

## 2023-10-05 PROCEDURE — 99284 EMERGENCY DEPT VISIT MOD MDM: CPT | Mod: 25 | Performed by: FAMILY MEDICINE

## 2023-10-05 PROCEDURE — 85652 RBC SED RATE AUTOMATED: CPT | Performed by: FAMILY MEDICINE

## 2023-10-05 PROCEDURE — 96374 THER/PROPH/DIAG INJ IV PUSH: CPT | Performed by: FAMILY MEDICINE

## 2023-10-05 PROCEDURE — 258N000003 HC RX IP 258 OP 636: Performed by: FAMILY MEDICINE

## 2023-10-05 PROCEDURE — 86140 C-REACTIVE PROTEIN: CPT | Performed by: FAMILY MEDICINE

## 2023-10-05 PROCEDURE — 99284 EMERGENCY DEPT VISIT MOD MDM: CPT | Performed by: FAMILY MEDICINE

## 2023-10-05 RX ORDER — DEXAMETHASONE SODIUM PHOSPHATE 10 MG/ML
10 INJECTION, SOLUTION INTRAMUSCULAR; INTRAVENOUS ONCE
Status: COMPLETED | OUTPATIENT
Start: 2023-10-05 | End: 2023-10-05

## 2023-10-05 RX ORDER — SODIUM CHLORIDE 9 MG/ML
1000 INJECTION, SOLUTION INTRAVENOUS CONTINUOUS
Status: DISCONTINUED | OUTPATIENT
Start: 2023-10-05 | End: 2023-10-05 | Stop reason: HOSPADM

## 2023-10-05 RX ORDER — KETOROLAC TROMETHAMINE 15 MG/ML
15 INJECTION, SOLUTION INTRAMUSCULAR; INTRAVENOUS ONCE
Status: COMPLETED | OUTPATIENT
Start: 2023-10-05 | End: 2023-10-05

## 2023-10-05 RX ADMIN — METOCLOPRAMIDE HYDROCHLORIDE 10 MG: 5 INJECTION INTRAMUSCULAR; INTRAVENOUS at 13:01

## 2023-10-05 RX ADMIN — KETOROLAC TROMETHAMINE 15 MG: 15 INJECTION, SOLUTION INTRAMUSCULAR; INTRAVENOUS at 12:52

## 2023-10-05 RX ADMIN — SODIUM CHLORIDE 1000 ML: 9 INJECTION, SOLUTION INTRAVENOUS at 12:54

## 2023-10-05 RX ADMIN — DEXAMETHASONE SODIUM PHOSPHATE 10 MG: 10 INJECTION, SOLUTION INTRAMUSCULAR; INTRAVENOUS at 12:51

## 2023-10-05 ASSESSMENT — ENCOUNTER SYMPTOMS
FREQUENCY: 0
NAUSEA: 1
SHORTNESS OF BREATH: 0
DYSURIA: 0
SINUS PRESSURE: 0
BLOOD IN STOOL: 0
SORE THROAT: 0
ABDOMINAL PAIN: 0
CHILLS: 0
PALPITATIONS: 0
HEADACHES: 1
COUGH: 0
DIARRHEA: 0
DIAPHORESIS: 0
WHEEZING: 0
VOMITING: 0
CONSTIPATION: 0
FEVER: 0

## 2023-10-05 ASSESSMENT — ACTIVITIES OF DAILY LIVING (ADL): ADLS_ACUITY_SCORE: 35

## 2023-10-05 NOTE — DISCHARGE INSTRUCTIONS
ICD-10-CM    1. Migraine without aura and without status migrainosus, not intractable  G43.009     reassuring eval..  decadrton should kalast for 60 houirs. styay hydrated.  ibuprofen or [id number] be uysed for recuyrrence/  follow-up clinic,.

## 2023-10-05 NOTE — ED TRIAGE NOTES
"Pt has been having pain in L side of head for the past 72 hours Pain is located slightly above and behind her temple region and is described \"like someone is taking a hammer and shoving it in my head\". Pt has been alternating tylenol and ibuprofen, with tylenol last taken at 8:30am this morning. Pain will occasionally radiate up to top of head and down into neck.   Pt reports intermittent nausea but denies any further neuro symptoms (no blurred vision, change in speech, confusion, or feeling of unbalance).      Triage Assessment       Row Name 10/05/23 1122       Triage Assessment (Adult)    Airway WDL WDL       Respiratory WDL    Respiratory WDL WDL       Skin Circulation/Temperature WDL    Skin Circulation/Temperature WDL WDL       Cardiac WDL    Cardiac WDL WDL       Peripheral/Neurovascular WDL    Peripheral Neurovascular WDL WDL       Cognitive/Neuro/Behavioral WDL    Cognitive/Neuro/Behavioral WDL WDL                    "

## 2023-10-05 NOTE — ED PROVIDER NOTES
History     Chief Complaint   Patient presents with     Headache     HPI  Ninfa Caicedo is a 37 year old female who presents with headache that is been primarily in the temporal region left side.  This has been ongoing for the last 72 hours.  No associated eye tearing or other cluster headache type findings.  Feels like a hammer she tells me is hammering on her head.  She has no history of autoimmune conditions or vasculitis history.  She has no vision changes blurred vision vision loss or scotomata.  She has no associated changes in speech or swallowing.  No weakness.  She has no incoordination.  There is no vomiting but there is nausea.  No obvious precipitating factors.  She has no associated fever cough congestion upper respiratory symptoms.  There is been no rash in this area.  There is no neck pain.    Allergies:  No Known Allergies    Problem List:    Patient Active Problem List    Diagnosis Date Noted     Pyelonephritis 04/08/2016     Priority: Medium     Vaginal delivery 06/10/2014     Priority: Medium     Encounter for supervision of other normal pregnancy 03/17/2014     Priority: Medium     Diagnosis updated by automated process. Provider to review and confirm.       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  Swab negative 3/14  Swab negative 6/14         Radial styloid tenosynovitis 04/12/2005     Priority: Medium        Past Medical History:    Past Medical History:   Diagnosis Date     Allergic rhinitis due to other allergen      Chickenpox      Tendonitis        Past Surgical History:    Past Surgical History:   Procedure Laterality Date     ORTHOPEDIC SURGERY      bilat wrist       Family History:    Family History   Problem Relation Age of Onset     Psychotic Disorder Father        Resolved   "  depression     Prostate Cancer Maternal Grandfather      Diabetes Maternal Grandfather      Cardiovascular Paternal Grandfather         pacemaker     Hypertension Mother      Gastrointestinal Disease Maternal Grandfather         Hepatitis C-blood transfusion     Depression Father        Social History:  Marital Status:  Single [1]  Social History     Tobacco Use     Smoking status: Never     Smokeless tobacco: Never   Substance Use Topics     Alcohol use: No     Drug use: No        Medications:    ibuprofen (ADVIL,MOTRIN) 400 MG tablet  Prenatal MV-Min-FA-Omega-3 (PRENATAL GUMMIES/DHA & FA) 0.4-32.5 MG CHEW  sucralfate (CARAFATE) 1 GM tablet          Review of Systems   Constitutional:  Negative for chills, diaphoresis and fever.   HENT:  Negative for ear pain, sinus pressure and sore throat.    Eyes:  Negative for visual disturbance.   Respiratory:  Negative for cough, shortness of breath and wheezing.    Cardiovascular:  Negative for chest pain and palpitations.   Gastrointestinal:  Positive for nausea. Negative for abdominal pain, blood in stool, constipation, diarrhea and vomiting.   Genitourinary:  Negative for dysuria, frequency and urgency.   Skin:  Negative for rash.   Neurological:  Positive for headaches.   All other systems reviewed and are negative.        Physical Exam   BP: 131/84  Pulse: 86  Temp: 98.9  F (37.2  C)  Resp: 18  Height: 167.6 cm (5' 6\")  Weight: 81.6 kg (180 lb)  SpO2: 99 %      Physical Exam  Constitutional:       General: She is in acute distress.      Appearance: She is not diaphoretic.   HENT:      Head: Atraumatic.   Eyes:      Extraocular Movements: Extraocular movements intact.      Conjunctiva/sclera: Conjunctivae normal.   Cardiovascular:      Rate and Rhythm: Normal rate and regular rhythm.      Heart sounds: No murmur heard.  Pulmonary:      Effort: No respiratory distress.      Breath sounds: No stridor. No wheezing or rhonchi.   Abdominal:      General: Abdomen is flat. " There is no distension.      Palpations: Abdomen is soft. There is no mass.      Tenderness: There is no abdominal tenderness. There is no guarding.   Musculoskeletal:      Cervical back: Neck supple.      Right lower leg: No edema.      Left lower leg: No edema.   Skin:     Coloration: Skin is not pale.      Findings: No rash.   Neurological:      General: No focal deficit present.      Mental Status: She is alert and oriented to person, place, and time.      Cranial Nerves: No cranial nerve deficit.      Sensory: No sensory deficit.      Motor: No weakness.      Coordination: Coordination normal.       The temporal artery is without focal tenderness    ED Course                 Procedures              Critical Care time:  none               Results for orders placed or performed during the hospital encounter of 10/05/23 (from the past 24 hour(s))   CRP Inflammation   Result Value Ref Range    CRP Inflammation 4.18 <5.00 mg/L   Erythrocyte sedimentation rate auto   Result Value Ref Range    Erythrocyte Sedimentation Rate 10 0 - 20 mm/hr   CBC with platelets, differential    Narrative    The following orders were created for panel order CBC with platelets, differential.  Procedure                               Abnormality         Status                     ---------                               -----------         ------                     CBC with platelets and d...[[id number]]                      Final result                 Please view results for these tests on the individual orders.   Basic metabolic panel   Result Value Ref Range    Sodium 139 135 - 145 mmol/L    Potassium 4.2 3.4 - 5.3 mmol/L    Chloride 107 98 - 107 mmol/L    Carbon Dioxide (CO2) 21 (L) 22 - 29 mmol/L    Anion Gap 11 7 - 15 mmol/L    Urea Nitrogen 13.0 6.0 - 20.0 mg/dL    Creatinine 1.01 (H) 0.51 - 0.95 mg/dL    GFR Estimate 73 >60 mL/min/1.73m2    Calcium 9.3 8.6 - 10.0 mg/dL    Glucose 94 70 - 99 mg/dL   CBC with platelets and  differential   Result Value Ref Range    WBC Count 4.5 4.0 - 11.0 10e3/uL    RBC Count 4.03 3.80 - 5.20 10e6/uL    Hemoglobin 12.1 11.7 - 15.7 g/dL    Hematocrit 36.7 35.0 - 47.0 %    MCV 91 78 - 100 fL    MCH 30.0 26.5 - 33.0 pg    MCHC 33.0 31.5 - 36.5 g/dL    RDW 13.2 10.0 - 15.0 %    Platelet Count 305 150 - 450 10e3/uL    % Neutrophils 52 %    % Lymphocytes 39 %    % Monocytes 5 %    Mids % (Monos, Eos, Basos)      % Eosinophils 4 %    % Basophils 0 %    % Immature Granulocytes 0 %    NRBCs per 100 WBC 0 <1 /100    Absolute Neutrophils 2.3 1.6 - 8.3 10e3/uL    Absolute Lymphocytes 1.7 0.8 - 5.3 10e3/uL    Absolute Monocytes 0.2 0.0 - 1.3 10e3/uL    Mids Abs (Monos, Eos, Basos)      Absolute Eosinophils 0.2 0.0 - 0.7 10e3/uL    Absolute Basophils 0.0 0.0 - 0.2 10e3/uL    Absolute Immature Granulocytes 0.0 <=0.4 10e3/uL    Absolute NRBCs 0.0 10e3/uL       Medications   sodium chloride 0.9% BOLUS 1,000 mL (1,000 mLs Intravenous $New Bag 10/5/23 4264)   sodium chloride 0.9 % infusion (has no administration in time range)   metoclopramide (REGLAN) 10 mg in sodium chloride 0.9 % 50 mL infusion (has no administration in time range)   ketorolac (TORADOL) injection 15 mg (15 mg Intravenous $Given 10/5/23 1252)   dexAMETHasone PF (DECADRON) injection 10 mg (10 mg Intravenous $Given 10/5/23 1251)       Assessments & Plan (with Medical Decision Making)     MDM: Ninfa Caicedo is a 37 year old female scented with temporal headache primarily on the left temporal region without obvious significant tenderness here normal CRP ESR no history of vasculitis.  Treated as migraine and saw significant improvement.  Did also receive Decadron to prevent rebound headaches.  No red flag findings reassuring exam..  Felt ready to go and is discharged home with precautions for return and additional recommendations as below.      I have reviewed the nursing notes.    I have reviewed the findings, diagnosis, plan and need for follow up  with the patient.           Medical Decision Making  The patient's presentation was of moderate complexity (an undiagnosed new problem with uncertain diagnosis).    The patient's evaluation involved:  ordering and/or review of 3+ test(s) in this encounter (see separate area of note for details)    The patient's management necessitated moderate risk (prescription drug management including medications given in the ED).        New Prescriptions    No medications on file       Final diagnoses:   Migraine without aura and without status migrainosus, not intractable - reassuring eval..  decadrton should kalast for 60 houirs. styay hydrated.  ibuprofen or [id number] be uysed for recuyrrence/  follow-up clinic,.       10/5/2023   [facility] EMERGENCY DEPT       Vivek Desir MD  10/05/23 1942

## 2024-02-06 ENCOUNTER — HOSPITAL ENCOUNTER (EMERGENCY)
Facility: CLINIC | Age: 39
Discharge: HOME | End: 2024-02-06
Attending: NURSE PRACTITIONER | Admitting: NURSE PRACTITIONER
Payer: COMMERCIAL

## 2024-02-06 ENCOUNTER — APPOINTMENT (OUTPATIENT)
Dept: GENERAL RADIOLOGY | Facility: CLINIC | Age: 39
End: 2024-02-06
Attending: NURSE PRACTITIONER
Payer: COMMERCIAL

## 2024-02-06 VITALS
WEIGHT: 185 LBS | HEART RATE: 85 BPM | BODY MASS INDEX: 29.73 KG/M2 | HEIGHT: 66 IN | DIASTOLIC BLOOD PRESSURE: 74 MMHG | RESPIRATION RATE: 18 BRPM | TEMPERATURE: 97.4 F | SYSTOLIC BLOOD PRESSURE: 112 MMHG | OXYGEN SATURATION: 98 %

## 2024-02-06 DIAGNOSIS — M77.11 LATERAL EPICONDYLITIS OF RIGHT ELBOW: ICD-10-CM

## 2024-02-06 PROCEDURE — 73070 X-RAY EXAM OF ELBOW: CPT | Mod: RT

## 2024-02-06 PROCEDURE — 99283 EMERGENCY DEPT VISIT LOW MDM: CPT | Performed by: NURSE PRACTITIONER

## 2024-02-06 NOTE — ED PROVIDER NOTES
History     Chief Complaint   Patient presents with    Elbow Pain     Right elbow pain since last fall-woke up today in tears     HPI  Ninfa Caicedo is a 38 year old female who presents emergency department with concerns of right elbow/forearm pain.  Patient states it started last fall.  She reports that certain activities specifically vacuuming aggravated the pain and she noted swelling with a severe pain.  Patient states the symptoms would resolve usually within a couple of hours.  Patient has utilized ice and ibuprofen to treat her symptoms.  Patient states over the last month her frequency of symptoms has increased drastically.  Patient states this past Saturday she was vacuuming and immediately she noted swelling in her elbow/forearm.  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  Patient denies any blunt force trauma.  Patient reports previous history of carpal tunnel syndrome and has had surgery on bilateral arms.    Allergies:  No Known Allergies    Problem List:    Patient Active Problem List    Diagnosis Date Noted    Pyelonephritis 04/08/2016     Priority: Medium    Vaginal delivery 06/10/2014     Priority: Medium    Encounter for supervision of other normal pregnancy 03/17/2014     Priority: Medium     Diagnosis updated by automated process. Provider to review and confirm.      Secondary physiologic amenorrhea 09/25/2013     Priority: Medium     Negative HCG  Normal abdominal limited US  Ordered TSH/FSH/Prolactin  Progesterone withdrawal bleed      MRSA 03/17/2008     Priority: Medium     MRSA Positive - R Arm Wound/Boil 3/7/2008.  Swab negative 3/14  Swab negative 6/14        Radial styloid tenosynovitis 04/12/2005     Priority: Medium        Past Medical History:    Past Medical History:   Diagnosis Date    Allergic rhinitis due to other allergen     Chickenpox     Tendonitis        Past Surgical History:    Past Surgical History:   Procedure Laterality Date  "   ORTHOPEDIC SURGERY      bilat wrist       Family History:    Family History   Problem Relation Age of Onset    Psychotic Disorder Father         depression    Prostate Cancer Maternal Grandfather     Diabetes Maternal Grandfather     Cardiovascular Paternal Grandfather         pacemaker    Hypertension Mother     Gastrointestinal Disease Maternal Grandfather         Hepatitis C-blood transfusion    Depression Father        Social History:  Marital Status:  Single [1]  Social History     Tobacco Use    Smoking status: Never    Smokeless tobacco: Never   Substance Use Topics    Alcohol use: No    Drug use: No        Medications:    ibuprofen (ADVIL,MOTRIN) 400 MG tablet  Prenatal MV-Min-FA-Omega-3 (PRENATAL GUMMIES/DHA & FA) 0.4-32.5 MG CHEW  sucralfate (CARAFATE) 1 GM tablet          Review of Systems    Physical Exam   BP: 112/74  Pulse: 85  Temp: 97.4  F (36.3  C)  Resp: 18  Height: 167.6 cm (5' 6\")  Weight: 83.9 kg (185 lb)  SpO2: 98 %      Physical Exam  Vitals and nursing note reviewed.   Constitutional:       General: She is not in acute distress.     Appearance: Normal appearance. She is well-developed. She is not ill-appearing, toxic-appearing or diaphoretic.   HENT:      Head: Normocephalic and atraumatic.      Right Ear: External ear normal.      Left Ear: External ear normal.   Eyes:      General:         Right eye: No discharge.         Left eye: No discharge.      Conjunctiva/sclera: Conjunctivae normal.   Cardiovascular:      Rate and Rhythm: Normal rate and regular rhythm.      Heart sounds: Normal heart sounds. No murmur heard.     No friction rub.   Pulmonary:      Effort: Pulmonary effort is normal. No respiratory distress.      Breath sounds: Normal breath sounds. No stridor. No wheezing or rales.   Chest:      Chest wall: No tenderness.   Musculoskeletal:      Right elbow: No swelling, deformity, effusion or lacerations. Normal range of motion. Tenderness present in lateral epicondyle. "   Skin:     General: Skin is warm and dry.      Coloration: Skin is not pale.      Findings: No erythema or rash.   Neurological:      Mental Status: She is alert.   Psychiatric:         Mood and Affect: Mood normal.         Behavior: Behavior normal.         ED Course              ED Course as of 02/06/24 1154   Tue Feb 06, 2024   1038 Elbow XR, 2 views, right  No acute changes noted.  No dislocation noted.     Procedures      Results for orders placed or performed during the hospital encounter of 02/06/24 (from the past 24 hour(s))   Elbow XR, 2 views, right    Narrative    ELBOW TWO VIEWS RIGHT 2/6/2024 10:38 AM     HISTORY: lateral epicondyle pain since last Fall  COMPARISON: None.    FINDINGS: There is no significant degenerative change. No posterior  fat-pad is seen. No convincing anterior sail sign is seen. There is no  acute fracture or dislocation. There are no worrisome bony lesions.      Impression    IMPRESSION: No acute osseous abnormality demonstrated.    GREGORIO COHEN MD         SYSTEM ID:  [id number]       Medications - No data to display    Assessments & Plan (with Medical Decision Making)     I have reviewed the nursing notes.    I have reviewed the findings, diagnosis, plan and need for follow up with the patient.  38-year-old female presents with acute on chronic right elbow pain aggravated by use and movement-specifically vacuuming.  Patient is treated with ice and ibuprofen.  Patient denying any blunt force trauma.  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  On exam patient is in no severe distress.  She is vitally stable.  Patient has pain on the lateral epicondyle tendon.  No warmth or swelling or redness to indicate infectious etiology.  X-ray obtained no acute fracture or dislocation.  Reviewed with patient lateral epicondylitis.  Discussed ibuprofen.  Given handout on exercises.  Orthopedic referral placed.  Patient discharged in  stable condition.  Patient agreeable to plan of care    Discharge Medication List as of 2/6/2024 10:43 AM          Final diagnoses:   Lateral epicondylitis of right elbow       2/6/2024   [facility] EMERGENCY DEPT       Conchis Barbour, VIRAJ CNP  02/06/24 1154

## 2024-02-06 NOTE — ED TRIAGE NOTES
Pt here with right elbow pain pt said she has had problems with it since last fall, but woke up today in tears. Pt said she takes ibuprofen and tylenol. No known injury. Pt has not been seen for this.      Triage Assessment (Adult)       Row Name 02/06/24 0954          Triage Assessment    Airway WDL WDL        Respiratory WDL    Respiratory WDL WDL        Cardiac WDL    Cardiac WDL WDL        Cognitive/Neuro/Behavioral WDL    Cognitive/Neuro/Behavioral WDL WDL

## 2024-02-06 NOTE — DISCHARGE INSTRUCTIONS
I recommend cold packs to the elbow when it is uncomfortable.  I recommend ibuprofen 600 mg twice daily when you are having elbow pain.  I have placed an orthopedic referral.  They will call to schedule an appointment with you.  I have attached tennis elbow exercises to help with your pain.  Have also placed a referral for primary care for you to establish care with someone.  You will need this if you will require surgery.

## 2024-06-16 ENCOUNTER — HEALTH MAINTENANCE LETTER (OUTPATIENT)
Age: 39
End: 2024-06-16

## 2024-11-19 ENCOUNTER — HOSPITAL ENCOUNTER (EMERGENCY)
Facility: CLINIC | Age: 39
Discharge: HOME | End: 2024-11-19
Attending: EMERGENCY MEDICINE | Admitting: EMERGENCY MEDICINE
Payer: COMMERCIAL

## 2024-11-19 ENCOUNTER — APPOINTMENT (OUTPATIENT)
Dept: GENERAL RADIOLOGY | Facility: CLINIC | Age: 39
End: 2024-11-19
Attending: EMERGENCY MEDICINE
Payer: COMMERCIAL

## 2024-11-19 VITALS
WEIGHT: 180 LBS | RESPIRATION RATE: 18 BRPM | OXYGEN SATURATION: 99 % | SYSTOLIC BLOOD PRESSURE: 129 MMHG | TEMPERATURE: 98.1 F | HEIGHT: 66 IN | HEART RATE: 73 BPM | BODY MASS INDEX: 28.93 KG/M2 | DIASTOLIC BLOOD PRESSURE: 93 MMHG

## 2024-11-19 DIAGNOSIS — F41.9 ANXIETY: ICD-10-CM

## 2024-11-19 DIAGNOSIS — R07.89 CHEST TIGHTNESS: ICD-10-CM

## 2024-11-19 LAB
ALBUMIN SERPL BCG-MCNC: 4.5 G/DL (ref 3.5–5.2)
ALP SERPL-CCNC: 75 U/L (ref 40–150)
ALT SERPL W P-5'-P-CCNC: 30 U/L (ref 0–50)
ANION GAP SERPL CALCULATED.3IONS-SCNC: 10 MMOL/L (ref 7–15)
AST SERPL W P-5'-P-CCNC: 32 U/L (ref 0–45)
ATRIAL RATE - MUSE: 69 BPM
BASOPHILS # BLD AUTO: 0 10E3/UL (ref 0–0.2)
BASOPHILS NFR BLD AUTO: 1 %
BILIRUB SERPL-MCNC: <0.2 MG/DL
BUN SERPL-MCNC: 11.4 MG/DL (ref 6–20)
CALCIUM SERPL-MCNC: 9.6 MG/DL (ref 8.8–10.4)
CHLORIDE SERPL-SCNC: 102 MMOL/L (ref 98–107)
CREAT SERPL-MCNC: 0.82 MG/DL (ref 0.51–0.95)
D DIMER PPP FEU-MCNC: <0.27 UG/ML FEU (ref 0–0.5)
DIASTOLIC BLOOD PRESSURE - MUSE: NORMAL MMHG
EGFRCR SERPLBLD CKD-EPI 2021: >90 ML/MIN/1.73M2
EOSINOPHIL # BLD AUTO: 0.1 10E3/UL (ref 0–0.7)
EOSINOPHIL NFR BLD AUTO: 2 %
ERYTHROCYTE [DISTWIDTH] IN BLOOD BY AUTOMATED COUNT: 12.5 % (ref 10–15)
GLUCOSE SERPL-MCNC: 92 MG/DL (ref 70–99)
HCO3 SERPL-SCNC: 26 MMOL/L (ref 22–29)
HCT VFR BLD AUTO: 38.1 % (ref 35–47)
HGB BLD-MCNC: 13.1 G/DL (ref 11.7–15.7)
IMM GRANULOCYTES # BLD: 0 10E3/UL
IMM GRANULOCYTES NFR BLD: 0 %
INTERPRETATION ECG - MUSE: NORMAL
LYMPHOCYTES # BLD AUTO: 1.8 10E3/UL (ref 0.8–5.3)
LYMPHOCYTES NFR BLD AUTO: 38 %
MCH RBC QN AUTO: 30.1 PG (ref 26.5–33)
MCHC RBC AUTO-ENTMCNC: 34.4 G/DL (ref 31.5–36.5)
MCV RBC AUTO: 88 FL (ref 78–100)
MONOCYTES # BLD AUTO: 0.3 10E3/UL (ref 0–1.3)
MONOCYTES NFR BLD AUTO: 5 %
NEUTROPHILS # BLD AUTO: 2.6 10E3/UL (ref 1.6–8.3)
NEUTROPHILS NFR BLD AUTO: 54 %
NRBC # BLD AUTO: 0 10E3/UL
NRBC BLD AUTO-RTO: 0 /100
P AXIS - MUSE: 27 DEGREES
PLATELET # BLD AUTO: 332 10E3/UL (ref 150–450)
POTASSIUM SERPL-SCNC: 4 MMOL/L (ref 3.4–5.3)
PR INTERVAL - MUSE: 166 MS
PROT SERPL-MCNC: 7.7 G/DL (ref 6.4–8.3)
QRS DURATION - MUSE: 88 MS
QT - MUSE: 408 MS
QTC - MUSE: 437 MS
R AXIS - MUSE: 68 DEGREES
RBC # BLD AUTO: 4.35 10E6/UL (ref 3.8–5.2)
SODIUM SERPL-SCNC: 138 MMOL/L (ref 135–145)
SYSTOLIC BLOOD PRESSURE - MUSE: NORMAL MMHG
T AXIS - MUSE: 26 DEGREES
TROPONIN T SERPL HS-MCNC: <6 NG/L
TSH SERPL DL<=0.005 MIU/L-ACNC: 2.9 UIU/ML (ref 0.3–4.2)
VENTRICULAR RATE- MUSE: 69 BPM
WBC # BLD AUTO: 4.9 10E3/UL (ref 4–11)

## 2024-11-19 PROCEDURE — 71046 X-RAY EXAM CHEST 2 VIEWS: CPT

## 2024-11-19 PROCEDURE — 85004 AUTOMATED DIFF WBC COUNT: CPT | Performed by: EMERGENCY MEDICINE

## 2024-11-19 PROCEDURE — 84155 ASSAY OF PROTEIN SERUM: CPT | Performed by: EMERGENCY MEDICINE

## 2024-11-19 PROCEDURE — 93005 ELECTROCARDIOGRAM TRACING: CPT | Performed by: EMERGENCY MEDICINE

## 2024-11-19 PROCEDURE — 85041 AUTOMATED RBC COUNT: CPT | Performed by: EMERGENCY MEDICINE

## 2024-11-19 PROCEDURE — 36415 COLL VENOUS BLD VENIPUNCTURE: CPT | Performed by: EMERGENCY MEDICINE

## 2024-11-19 PROCEDURE — 93010 ELECTROCARDIOGRAM REPORT: CPT | Performed by: EMERGENCY MEDICINE

## 2024-11-19 PROCEDURE — 82040 ASSAY OF SERUM ALBUMIN: CPT | Performed by: EMERGENCY MEDICINE

## 2024-11-19 PROCEDURE — 85379 FIBRIN DEGRADATION QUANT: CPT | Performed by: EMERGENCY MEDICINE

## 2024-11-19 PROCEDURE — 99284 EMERGENCY DEPT VISIT MOD MDM: CPT | Performed by: EMERGENCY MEDICINE

## 2024-11-19 PROCEDURE — 99285 EMERGENCY DEPT VISIT HI MDM: CPT | Mod: 25 | Performed by: EMERGENCY MEDICINE

## 2024-11-19 PROCEDURE — 84484 ASSAY OF TROPONIN QUANT: CPT | Performed by: EMERGENCY MEDICINE

## 2024-11-19 PROCEDURE — 84443 ASSAY THYROID STIM HORMONE: CPT | Performed by: EMERGENCY MEDICINE

## 2024-11-19 RX ORDER — HYDROXYZINE HYDROCHLORIDE 25 MG/1
25 TABLET, FILM COATED ORAL 3 TIMES DAILY PRN
Qty: 20 TABLET | Refills: 0 | Status: SHIPPED | OUTPATIENT
Start: 2024-11-19

## 2024-11-19 ASSESSMENT — COLUMBIA-SUICIDE SEVERITY RATING SCALE - C-SSRS
1. IN THE PAST MONTH, HAVE YOU WISHED YOU WERE DEAD OR WISHED YOU COULD GO TO SLEEP AND NOT WAKE UP?: NO
6. HAVE YOU EVER DONE ANYTHING, STARTED TO DO ANYTHING, OR PREPARED TO DO ANYTHING TO END YOUR LIFE?: NO
2. HAVE YOU ACTUALLY HAD ANY THOUGHTS OF KILLING YOURSELF IN THE PAST MONTH?: NO

## 2024-11-19 ASSESSMENT — ACTIVITIES OF DAILY LIVING (ADL)
ADLS_ACUITY_SCORE: 0

## 2024-11-19 NOTE — ED TRIAGE NOTES
Pt presents to ED with concerns of anxiety and SOA-pt expresses some SOA after activity. Pt reports lots of stressors-boyfriend/family has anxiety/bipolar, recent loss of pet. Pt also expresses having anxiety when hosting people at her house. States she wants to see PCP but was unable to get in for 3-4 weeks.      Triage Assessment (Adult)       Row Name 11/19/24 1144          Triage Assessment    Airway WDL WDL        Respiratory WDL    Respiratory WDL X;rhythm/pattern     Rhythm/Pattern, Respiratory dyspnea upon exertion        Cardiac WDL    Cardiac WDL X;chest pain        Chest Pain Assessment    Chest Pain Location midsternal     Associated Signs/Symptoms anxiety     Chest Pain Intervention activity minimized        Cognitive/Neuro/Behavioral WDL    Cognitive/Neuro/Behavioral WDL X     Mood/Behavior anxious

## 2024-11-19 NOTE — ED PROVIDER NOTES
History     Chief Complaint   Patient presents with    Anxiety     Concerned for anxiety/stress, family hx of mental health conditions-anxiety and bipolar    Shortness of Breath     SOA after activity     HPI  Ninfa Caicedo is a 38 year old female who presents for evaluation of chest pressure and tightness with minimal exertion.  Just feels very short of breath.  She has been under a lot of increased stress in her work and her home life.  Also had recent loss of a pet which is hard for her to handle.  Both of her parents and 2 siblings have issues with mental health and are on antidepressants and antianxiety medications.  She was hoping to discuss this with her primary care doctor but it was 3 to 4 weeks before she can get an appointment, that last year she had epigastric discomfort and tried Prilosec but this made no change in her symptoms.  No cough.  No fevers.  No abdominal pain.  Denies any thoughts of wanting to harm herself or others.    The patient's PMHx, Surgical Hx, Allergies, and Medications were all reviewed with the patient.    Allergies:  No Known Allergies    Problem List:    Patient Active Problem List    Diagnosis Date Noted    Pyelonephritis 04/08/2016     Priority: Medium    Vaginal delivery 06/10/2014     Priority: Medium    Encounter for supervision of other normal pregnancy 03/17/2014     Priority: Medium     Diagnosis updated by automated process. Provider to review and confirm.      Secondary physiologic amenorrhea 09/25/2013     Priority: Medium     Negative HCG  Normal abdominal limited US  Ordered TSH/FSH/Prolactin  Progesterone withdrawal bleed      MRSA 03/17/2008     Priority: Medium     MRSA Positive - R Arm Wound/Boil 3/7/2008.  Swab negative 3/14  Swab negative 6/14        Radial styloid tenosynovitis 04/12/2005     Priority: Medium        Past Medical History:    Past Medical History:   Diagnosis Date    Allergic rhinitis due to other allergen     Chickenpox     Tendonitis   "      Past Surgical History:    Past Surgical History:   Procedure Laterality Date    ORTHOPEDIC SURGERY      bilat wrist       Family History:    Family History   Problem Relation Age of Onset    Psychotic Disorder Father         depression    Prostate Cancer Maternal Grandfather     Diabetes Maternal Grandfather     Cardiovascular Paternal Grandfather         pacemaker    Hypertension Mother     Gastrointestinal Disease Maternal Grandfather         Hepatitis C-blood transfusion    Depression Father        Social History:  Marital Status:  Single [1]  Social History     Tobacco Use    Smoking status: Never    Smokeless tobacco: Never   Substance Use Topics    Alcohol use: No    Drug use: No        Medications:    hydrOXYzine HCl (ATARAX) 25 MG tablet  ibuprofen (ADVIL,MOTRIN) 400 MG tablet  Prenatal MV-Min-FA-Omega-3 (PRENATAL GUMMIES/DHA & FA) 0.4-32.5 MG CHEW  sucralfate (CARAFATE) 1 GM tablet          Review of Systems  Pertinent positives and negatives mentioned in HPI    Physical Exam   BP: 121/89  Pulse: 84  Temp: 98.2  F (36.8  C)  Resp: 20  Height: 167.6 cm (5' 6\")  Weight: 81.6 kg (180 lb)  SpO2: 99 %    GEN: Awake, alert, and cooperative.  Appears slightly anxious but not distressed  NECK: Symmetric, freely mobile.   CV : Regular rate and rhythm.  PULM: Normal effort. No wheezes, rales, or rhonchi bilaterally.  ABD: Soft, non-tender, non-distended. No rebound or guarding.   NEURO: Normal speech. Following commands. Answering questions and interacting appropriately.   PSYCH: normal mood. Slightly anxious affect. Logical thought processes.         ED Course        Procedures         EKG: Interpreted by Seamus Breen MD sinus rhythm with rate of 69 bpm.  Normal axis.  Normal R wave progression.  No ST segment elevations or depressions.  Normal intervals.  Impression normal sinus rhythm.       Critical Care time:  none              Results for orders placed or performed during the hospital encounter of " 11/19/24 (from the past 24 hours)   EKG 12-lead, tracing only   Result Value Ref Range    Systolic Blood Pressure  mmHg    Diastolic Blood Pressure  mmHg    Ventricular Rate 69 BPM    Atrial Rate 69 BPM    MA Interval 166 ms    QRS Duration 88 ms     ms    QTc 437 ms    P Axis 27 degrees    R AXIS 68 degrees    T Axis 26 degrees    Interpretation ECG       Sinus rhythm  Normal ECG  No previous ECGs available     CBC with platelets differential    Narrative    The following orders were created for panel order CBC with platelets differential.  Procedure                               Abnormality         Status                     ---------                               -----------         ------                     CBC with platelets and d...[[id number]]                      Final result                 Please view results for these tests on the individual orders.   Comprehensive metabolic panel   Result Value Ref Range    Sodium 138 135 - 145 mmol/L    Potassium 4.0 3.4 - 5.3 mmol/L    Carbon Dioxide (CO2) 26 22 - 29 mmol/L    Anion Gap 10 7 - 15 mmol/L    Urea Nitrogen 11.4 6.0 - 20.0 mg/dL    Creatinine 0.82 0.51 - 0.95 mg/dL    GFR Estimate >90 >60 mL/min/1.73m2    Calcium 9.6 8.8 - 10.4 mg/dL    Chloride 102 98 - 107 mmol/L    Glucose 92 70 - 99 mg/dL    Alkaline Phosphatase 75 40 - 150 U/L    AST 32 0 - 45 U/L    ALT 30 0 - 50 U/L    Protein Total 7.7 6.4 - 8.3 g/dL    Albumin 4.5 3.5 - 5.2 g/dL    Bilirubin Total <0.2 <=1.2 mg/dL   TSH with free T4 reflex   Result Value Ref Range    TSH 2.90 0.30 - 4.20 uIU/mL   D dimer quantitative   Result Value Ref Range    D-Dimer Quantitative <0.27 0.00 - 0.50 ug/mL FEU    Narrative    This D-dimer assay is intended for use in conjunction with a clinical pretest probability assessment model to exclude pulmonary embolism (PE) and deep venous thrombosis (DVT) in outpatients suspected of PE or DVT. The cut-off value is 0.50 ug/mL FEU.   Troponin T, High Sensitivity    Result Value Ref Range    Troponin T, High Sensitivity <6 <=14 ng/L   CBC with platelets and differential   Result Value Ref Range    WBC Count 4.9 4.0 - 11.0 10e3/uL    RBC Count 4.35 3.80 - 5.20 10e6/uL    Hemoglobin 13.1 11.7 - 15.7 g/dL    Hematocrit 38.1 35.0 - 47.0 %    MCV 88 78 - 100 fL    MCH 30.1 26.5 - 33.0 pg    MCHC 34.4 31.5 - 36.5 g/dL    RDW 12.5 10.0 - 15.0 %    Platelet Count 332 150 - 450 10e3/uL    % Neutrophils 54 %    % Lymphocytes 38 %    % Monocytes 5 %    % Eosinophils 2 %    % Basophils 1 %    % Immature Granulocytes 0 %    NRBCs per 100 WBC 0 <1 /100    Absolute Neutrophils 2.6 1.6 - 8.3 10e3/uL    Absolute Lymphocytes 1.8 0.8 - 5.3 10e3/uL    Absolute Monocytes 0.3 0.0 - 1.3 10e3/uL    Absolute Eosinophils 0.1 0.0 - 0.7 10e3/uL    Absolute Basophils 0.0 0.0 - 0.2 10e3/uL    Absolute Immature Granulocytes 0.0 <=0.4 10e3/uL    Absolute NRBCs 0.0 10e3/uL   Chest XR,  PA & LAT    Narrative    XR CHEST 2 VIEWS 11/19/2024 2:22 PM    HISTORY: exertional dyspnea and central chest pressure    COMPARISON: 8/5/2023      Impression    IMPRESSION: No acute cardiopulmonary process.    RADHA SAWANT MD         SYSTEM ID:  [id number]       Medications - No data to display    Assessments & Plan (with Medical Decision Making)   38 year old female with several weeks of exertional dyspnea and worsening anxiety.  Vital signs reassuring and exam unremarkable.  ECG without evidence of acute ischemia or arrhythmia.    -Troponin less than 6.  Presentation inconsistent with ACS.  Chest x-ray.  Without acute infiltrate, effusion or pneumothorax on my interpretation.  D-dimer is not elevated and with low pretest probability essentially ruling out PE.  CBC and CMP both normal.  TSH normal at 2.9.    Given her reassuring medical exam I think she would benefit from mental health counseling plus or minus medication therapy.  Mental health  referral placed.  Did send a prescription for hydroxyzine to be taken  for anxiety.  Follow-up and ED return precautions discussed.       I have reviewed the nursing notes.         New Prescriptions    HYDROXYZINE HCL (ATARAX) 25 MG TABLET    Take 1 tablet (25 mg) by mouth 3 times daily as needed for anxiety.       Final diagnoses:   Chest tightness   Anxiety     Seamus Breen MD        11/19/2024   [facility] EMERGENCY DEPT    Disclaimer: This note consists of words and symbols derived from keyboarding and dictation using voice recognition software.  As a result, there may be errors that have gone undetected.  Please consider this when interpreting information found in this note.               Seamus Breen MD  11/19/24 [id number]

## 2024-11-19 NOTE — DISCHARGE INSTRUCTIONS
Your medical work up in the Emergency Department was reassuring.     Take hydroxyzine as prescribed for anxiety.     A referral for a Counsellor has been made and you should get a call to set up an appointment.     If your symptoms worsen or you develop new or concerning symptoms, please return to the Emergency Department for further evaluation and treatment.      You will feel better!!

## 2025-01-14 ENCOUNTER — OFFICE VISIT (OUTPATIENT)
Dept: FAMILY MEDICINE | Facility: CLINIC | Age: 40
End: 2025-01-14
Payer: COMMERCIAL

## 2025-01-14 VITALS
OXYGEN SATURATION: 99 % | RESPIRATION RATE: 20 BRPM | HEART RATE: 102 BPM | TEMPERATURE: 97.8 F | DIASTOLIC BLOOD PRESSURE: 90 MMHG | WEIGHT: 185.8 LBS | SYSTOLIC BLOOD PRESSURE: 130 MMHG | BODY MASS INDEX: 30.96 KG/M2 | HEIGHT: 65 IN

## 2025-01-14 DIAGNOSIS — R14.0 BLOATING SYMPTOM: ICD-10-CM

## 2025-01-14 DIAGNOSIS — K21.00 GASTROESOPHAGEAL REFLUX DISEASE WITH ESOPHAGITIS WITHOUT HEMORRHAGE: ICD-10-CM

## 2025-01-14 DIAGNOSIS — K04.7 ABSCESSED TOOTH: ICD-10-CM

## 2025-01-14 DIAGNOSIS — R13.14 PHARYNGOESOPHAGEAL DYSPHAGIA: Primary | ICD-10-CM

## 2025-01-14 DIAGNOSIS — F41.1 GAD (GENERALIZED ANXIETY DISORDER): ICD-10-CM

## 2025-01-14 DIAGNOSIS — F32.1 CURRENT MODERATE EPISODE OF MAJOR DEPRESSIVE DISORDER, UNSPECIFIED WHETHER RECURRENT (H): ICD-10-CM

## 2025-01-14 DIAGNOSIS — R11.0 NAUSEA: ICD-10-CM

## 2025-01-14 PROCEDURE — 99214 OFFICE O/P EST MOD 30 MIN: CPT | Performed by: NURSE PRACTITIONER

## 2025-01-14 RX ORDER — CITALOPRAM HYDROBROMIDE 20 MG/1
20 TABLET ORAL DAILY
Qty: 30 TABLET | Refills: 1 | Status: SHIPPED | OUTPATIENT
Start: 2025-01-14

## 2025-01-14 RX ORDER — PANTOPRAZOLE SODIUM 40 MG/1
40 TABLET, DELAYED RELEASE ORAL DAILY
Qty: 30 TABLET | Refills: 5 | Status: SHIPPED | OUTPATIENT
Start: 2025-01-14

## 2025-01-14 RX ORDER — ONDANSETRON 4 MG/1
4 TABLET, ORALLY DISINTEGRATING ORAL EVERY 8 HOURS PRN
Qty: 30 TABLET | Refills: 1 | Status: SHIPPED | OUTPATIENT
Start: 2025-01-14

## 2025-01-14 RX ORDER — AMOXICILLIN 500 MG/1
500 CAPSULE ORAL 2 TIMES DAILY
Qty: 14 CAPSULE | Refills: 0 | Status: SHIPPED | OUTPATIENT
Start: 2025-01-14 | End: 2025-01-21

## 2025-01-14 RX ORDER — HYDROCODONE BITARTRATE AND ACETAMINOPHEN 5; 325 MG/1; MG/1
1 TABLET ORAL EVERY 6 HOURS PRN
Qty: 10 TABLET | Refills: 0 | Status: SHIPPED | OUTPATIENT
Start: 2025-01-14 | End: 2025-01-17

## 2025-01-14 ASSESSMENT — PATIENT HEALTH QUESTIONNAIRE - PHQ9
SUM OF ALL RESPONSES TO PHQ QUESTIONS 1-9: 15
5. POOR APPETITE OR OVEREATING: NEARLY EVERY DAY

## 2025-01-14 ASSESSMENT — ANXIETY QUESTIONNAIRES
1. FEELING NERVOUS, ANXIOUS, OR ON EDGE: NEARLY EVERY DAY
IF YOU CHECKED OFF ANY PROBLEMS ON THIS QUESTIONNAIRE, HOW DIFFICULT HAVE THESE PROBLEMS MADE IT FOR YOU TO DO YOUR WORK, TAKE CARE OF THINGS AT HOME, OR GET ALONG WITH OTHER PEOPLE: VERY DIFFICULT
3. WORRYING TOO MUCH ABOUT DIFFERENT THINGS: NEARLY EVERY DAY
6. BECOMING EASILY ANNOYED OR IRRITABLE: MORE THAN HALF THE DAYS
5. BEING SO RESTLESS THAT IT IS HARD TO SIT STILL: NEARLY EVERY DAY
GAD7 TOTAL SCORE: 20
7. FEELING AFRAID AS IF SOMETHING AWFUL MIGHT HAPPEN: NEARLY EVERY DAY
GAD7 TOTAL SCORE: 20
2. NOT BEING ABLE TO STOP OR CONTROL WORRYING: NEARLY EVERY DAY

## 2025-01-14 ASSESSMENT — PAIN SCALES - GENERAL: PAINLEVEL_OUTOF10: WORST PAIN (10)

## 2025-01-14 NOTE — H&P (VIEW-ONLY)
Assessment & Plan     Pharyngoesophageal dysphagia  Since Prilosec has not been helpful will start patient on 40 mg of Protonix daily for the next 30 days and if symptoms improve she can use as needed.  I have ordered EGD for further evaluation to rule out eosinophilic esophagitis, gastritis, ulceration, or H. pylori as causes for her symptoms.  If EGD is negative and biopsies are negative, would plan for her to follow-up with GI for further evaluation and treatment.  - Adult GI  Referral - Procedure Only; Future    Gastroesophageal reflux disease with esophagitis without hemorrhage  See note above.  - Adult GI  Referral - Procedure Only; Future  - pantoprazole (PROTONIX) 40 MG EC tablet; Take 1 tablet (40 mg) by mouth daily.  - ondansetron (ZOFRAN ODT) 4 MG ODT tab; Take 1 tablet (4 mg) by mouth every 8 hours as needed for nausea.    Bloating symptom  Advise use of Beano and Gas-X as needed for bloating symptoms.    Nausea  Filled Zofran 4 mg disintegrating tablets to use as needed for nausea which I am uncertain as caused by her abscessed tooth versus her GI symptoms.  - ondansetron (ZOFRAN ODT) 4 MG ODT tab; Take 1 tablet (4 mg) by mouth every 8 hours as needed for nausea.    Abscessed tooth  Patient was given 10 tablets of Vicodin for pain and I filled amoxicillin 500 mg twice a day for her infection in her tooth for 7 days.  Follow-up recommended if any recurring symptoms until she get into the dentist.  - amoxicillin (AMOXIL) 500 MG capsule; Take 1 capsule (500 mg) by mouth 2 times daily for 7 days.  - HYDROcodone-acetaminophen (NORCO) 5-325 MG tablet; Take 1 tablet by mouth every 6 hours as needed for severe pain.    LIZY (generalized anxiety disorder)  Patient has high LIZY-7 scoring of 20.  Starting on citalopram 10 mg for the first week and if she is tolerating this well can go up to a full tablet.  I also put in a referral for mental health so she can make an appointment.  I recommended  her to increase exercise to help both with increasing endorphins and to burn off some of her anxiety as well.  I recommend follow-up virtual visit in 1 month for recheck on her scoring and to see how she is doing with her anxiety.  - Adult Mental Health  Referral; Future    Current moderate episode of major depressive disorder, unspecified whether recurrent (H)  Patient scored moderate to high on her PHQ-9.  Starting her on citalopram 10 mg for the first week and if she is tolerating this well can go up to a full tablet or 20 mg.  I put in a referral for mental health so she can make an appointment.  I recommend follow-up virtual visit in 1 month for recheck on her scoring to see how she is doing with her depression.  - citalopram (CELEXA) 20 MG tablet; Take 1 tablet (20 mg) by mouth daily. Start 1/2 tab (10 mg) for 1 week and then increase to 20 mg.  - Adult Mental Health  Referral; Future      Depression Screening Follow Up        1/14/2025    11:20 AM   PHQ   PHQ-9 Total Score 15   Q9: Thoughts of better off dead/self-harm past 2 weeks Not at all         1/14/2025    11:20 AM   Last PHQ-9   1.  Little interest or pleasure in doing things 2   2.  Feeling down, depressed, or hopeless 0   3.  Trouble falling or staying asleep, or sleeping too much 3   4.  Feeling tired or having little energy 2   5.  Poor appetite or overeating 3   6.  Feeling bad about yourself 0   7.  Trouble concentrating 2   8.  Moving slowly or restless 3   Q9: Thoughts of better off dead/self-harm past 2 weeks 0   PHQ-9 Total Score 15   Difficulty at work, home, or with people Very difficult         1/14/2025    11:20 AM   LIZY-7 SCORE   Total Score 20       Follow Up Actions Taken  Crisis resource information provided in After Visit Summary  Mental Health Referral placed  Started patient on anti-depressant.       See Patient Instructions    Bandar Ocasio is a 39 year old, presenting for the following health  issues:  Chest Pain (Pt being seen for ongoing chest discomfort over the past 6 months.  Has been to e/r several times for this.  Was told to take prilosec for acid reflux.) and Pain (Pt also having left side tooth/jaw pain.)        1/14/2025    10:44 AM   Additional Questions   Roomed by Coral Bishop         1/14/2025    10:44 AM   Patient Reported Additional Medications   Patient reports taking the following new medications none     History of Present Illness       Reason for visit:  Acidic reflux, anxiety, mouth pain    She eats 4 or more servings of fruits and vegetables daily.She consumes 1 sweetened beverage(s) daily.She exercises with enough effort to increase her heart rate 30 to 60 minutes per day.  She exercises with enough effort to increase her heart rate 7 days per week.   She is taking medications regularly.     Abnormal Mood Symptoms  Onset/Duration: ongoing, worse last couple months  Description: Pt having lots of anxiety over the past couple months.  Depression (if yes, do PHQ-9): No  Anxiety (if yes, do LIZY-7): YES  Accompanying Signs & Symptoms:  Still participating in activities that you used to enjoy: YES  Fatigue: YES  Irritability: YES- sometimes  Difficulty concentrating: YES- always  Changes in appetite: YES  Problems with sleep: YES  Heart racing/beating fast: No  Abnormally elevated, expansive, or irritable mood: No  Persistently increased activity or energy: No  Thoughts of hurting yourself or others: No  History:  Recent stress or major life event: normal everyday stuff, 4 kids  Prior depression or anxiety: on and off  Family history of depression or anxiety: YES- parents and siblings  Alcohol/drug use: No  Difficulty sleeping: YES  Precipitating or alleviating factors: None  Therapies tried and outcome: none      1/14/2025    11:20 AM   PHQ   PHQ-9 Total Score 15   Q9: Thoughts of better off dead/self-harm past 2 weeks Not at all         1/14/2025    11:20 AM   LIZY-7 SCORE   Total  Score 20     Patient states that she has always had issues with anxiety and depression.  She does feel very overwhelmed at times.  She does not have good coping mechanisms and usually will just shut down when symptoms get bad.  Patient denies any suicidal ideations or thoughts of herself at this time.      GERD/Heartburn  Onset/Duration: since november  Description: Feels like something is sitting on her chest, hard to take a deep breath.  Intensity: severe  Progression of Symptoms: same and waxing and waning  Accompanying Signs & Symptoms:  Does it feel like food gets stuck or trouble swallowing: No  Nausea: No  Vomiting (bloody?): No  Abdominal Pain: YES- little  Black-Tarry stools: No  Bloody stools: No  History:  Previous similar episodes: No  Previous ulcers: No  Precipitating factors:   Caffeine use: stopped  Alcohol use: No  NSAID/Aspirin use: YES  Tobacco use: No  Worse with spicy foods and caffeinated drinks.  Alleviating factors: None  Therapies tried and outcome:             Lifestyle changes: stopped caffiene            Medications: Omeprazole (Prilosec), did not help  Patient was seen in the ER in November and placed on Prilosec 1-1/2 tablets daily if bad and Carafate which did not help.  Patient states that symptoms were worse when she was drinking a lot of caffeine with her soda pop and she has discontinued this and does see some improvement without this in her system.  She denies any coughing up of coffee grounds or blood.  She has epigastric pain and tenderness that is on and off.  She also feels that there is tightness in her esophagus especially when she is eating food.  This worsens with some foods than others but is occurring with everything she eats.  Patient states that her bowel movements are normal and she does not have constipation.  Patient states that she is easily nauseated and has been dealing with pregnancy.  She also complains of intermittent bloating symptoms as well.  EKG was  "completed in the emergency room and was normal as well as other tests and chest x-ray.  Patient follows up today since medications given have not been helpful.      Tooth Pain  When did you first notice your pain? 3:00 a.m.   Have you seen anyone else for your pain? No  How has your pain affected your ability to work? Not applicable  Where in your body do you have pain?  Left side of jaw, teeth  Patient feels that she might have an abscessed tooth.  She recently got on state dental coverage and is unable to get appointment for 3 months.  The pain in her mouth is causing her to get nauseated as well which she states that she easily does.      Review of Systems  CONSTITUTIONAL: NEGATIVE for fever, chills, change in weight  ENT/MOUTH: POSITIVE for tooth pain as per HPI  RESP: NEGATIVE for significant cough or SOB  CV: NEGATIVE for chest pain, palpitations or peripheral edema  GI: POSITIVE for abdominal pain epigastric, dyspepsia, gas or bloating, and nausea  PSYCHIATRIC: POSITIVE foranxiety and depressed mood  ROS otherwise negative      Objective    BP (!) 130/90   Pulse 102   Temp 97.8  F (36.6  C) (Tympanic)   Resp 20   Ht 1.645 m (5' 4.75\")   Wt 84.3 kg (185 lb 12.8 oz)   LMP 12/27/2024 (Exact Date)   SpO2 99%   BMI 31.16 kg/m    Body mass index is 31.16 kg/m .  Physical Exam   GENERAL: alert and no distress  RESP: lungs clear to auscultation - no rales, rhonchi or wheezes  CV: regular rate and rhythm, normal S1 S2, no S3 or S4, no murmur, click or rub, no peripheral edema  ABDOMEN: soft, tenderness epigastric, no organomegaly or masses, and bowel sounds normal  PSYCH: affect normal/bright, tearful, anxious, speech pressured, and judgement and insight intact    Signed Electronically by: Radha Vanessa NP    " 19-Mar-2018

## 2025-01-14 NOTE — PATIENT INSTRUCTIONS
Start protonix 40 mg daily for 1 month then use as needed if improvement.  In the meantime, get scheduled for upper endoscopy to look in your stomach and esophagus to see if there is any signs of damage and take biopsies to rule out any bacterial or other causes.  Use Beano before meals and at bedtime as needed for bloating to see if this improves symptoms.  You can also use GasX to help in addition to this.  Take zofran as directed when needed for nausea.  Start Citalopram 1/2 tab for a week and if no side effects increase to 20 mg daily.  Follow-up in clinic for recheck on mood virtually in 1 month.  Make appointment with counseling and work on exercise during the winter months.  Take amoxicillin twice daily for tooth abscess for 7 days.  I filled 10 tabs of vicodine for pain, use only as needed.

## 2025-01-14 NOTE — PROGRESS NOTES
Assessment & Plan     Pharyngoesophageal dysphagia  Since Prilosec has not been helpful will start patient on 40 mg of Protonix daily for the next 30 days and if symptoms improve she can use as needed.  I have ordered EGD for further evaluation to rule out eosinophilic esophagitis, gastritis, ulceration, or H. pylori as causes for her symptoms.  If EGD is negative and biopsies are negative, would plan for her to follow-up with GI for further evaluation and treatment.  - Adult GI  Referral - Procedure Only; Future    Gastroesophageal reflux disease with esophagitis without hemorrhage  See note above.  - Adult GI  Referral - Procedure Only; Future  - pantoprazole (PROTONIX) 40 MG EC tablet; Take 1 tablet (40 mg) by mouth daily.  - ondansetron (ZOFRAN ODT) 4 MG ODT tab; Take 1 tablet (4 mg) by mouth every 8 hours as needed for nausea.    Bloating symptom  Advise use of Beano and Gas-X as needed for bloating symptoms.    Nausea  Filled Zofran 4 mg disintegrating tablets to use as needed for nausea which I am uncertain as caused by her abscessed tooth versus her GI symptoms.  - ondansetron (ZOFRAN ODT) 4 MG ODT tab; Take 1 tablet (4 mg) by mouth every 8 hours as needed for nausea.    Abscessed tooth  Patient was given 10 tablets of Vicodin for pain and I filled amoxicillin 500 mg twice a day for her infection in her tooth for 7 days.  Follow-up recommended if any recurring symptoms until she get into the dentist.  - amoxicillin (AMOXIL) 500 MG capsule; Take 1 capsule (500 mg) by mouth 2 times daily for 7 days.  - HYDROcodone-acetaminophen (NORCO) 5-325 MG tablet; Take 1 tablet by mouth every 6 hours as needed for severe pain.    LIZY (generalized anxiety disorder)  Patient has high LIZY-7 scoring of 20.  Starting on citalopram 10 mg for the first week and if she is tolerating this well can go up to a full tablet.  I also put in a referral for mental health so she can make an appointment.  I recommended  her to increase exercise to help both with increasing endorphins and to burn off some of her anxiety as well.  I recommend follow-up virtual visit in 1 month for recheck on her scoring and to see how she is doing with her anxiety.  - Adult Mental Health  Referral; Future    Current moderate episode of major depressive disorder, unspecified whether recurrent (H)  Patient scored moderate to high on her PHQ-9.  Starting her on citalopram 10 mg for the first week and if she is tolerating this well can go up to a full tablet or 20 mg.  I put in a referral for mental health so she can make an appointment.  I recommend follow-up virtual visit in 1 month for recheck on her scoring to see how she is doing with her depression.  - citalopram (CELEXA) 20 MG tablet; Take 1 tablet (20 mg) by mouth daily. Start 1/2 tab (10 mg) for 1 week and then increase to 20 mg.  - Adult Mental Health  Referral; Future      Depression Screening Follow Up        1/14/2025    11:20 AM   PHQ   PHQ-9 Total Score 15   Q9: Thoughts of better off dead/self-harm past 2 weeks Not at all         1/14/2025    11:20 AM   Last PHQ-9   1.  Little interest or pleasure in doing things 2   2.  Feeling down, depressed, or hopeless 0   3.  Trouble falling or staying asleep, or sleeping too much 3   4.  Feeling tired or having little energy 2   5.  Poor appetite or overeating 3   6.  Feeling bad about yourself 0   7.  Trouble concentrating 2   8.  Moving slowly or restless 3   Q9: Thoughts of better off dead/self-harm past 2 weeks 0   PHQ-9 Total Score 15   Difficulty at work, home, or with people Very difficult         1/14/2025    11:20 AM   LIZY-7 SCORE   Total Score 20       Follow Up Actions Taken  Crisis resource information provided in After Visit Summary  Mental Health Referral placed  Started patient on anti-depressant.       See Patient Instructions    Bandar Ocasio is a 39 year old, presenting for the following health  issues:  Chest Pain (Pt being seen for ongoing chest discomfort over the past 6 months.  Has been to e/r several times for this.  Was told to take prilosec for acid reflux.) and Pain (Pt also having left side tooth/jaw pain.)        1/14/2025    10:44 AM   Additional Questions   Roomed by Coral Bishop         1/14/2025    10:44 AM   Patient Reported Additional Medications   Patient reports taking the following new medications none     History of Present Illness       Reason for visit:  Acidic reflux, anxiety, mouth pain    She eats 4 or more servings of fruits and vegetables daily.She consumes 1 sweetened beverage(s) daily.She exercises with enough effort to increase her heart rate 30 to 60 minutes per day.  She exercises with enough effort to increase her heart rate 7 days per week.   She is taking medications regularly.     Abnormal Mood Symptoms  Onset/Duration: ongoing, worse last couple months  Description: Pt having lots of anxiety over the past couple months.  Depression (if yes, do PHQ-9): No  Anxiety (if yes, do LIZY-7): YES  Accompanying Signs & Symptoms:  Still participating in activities that you used to enjoy: YES  Fatigue: YES  Irritability: YES- sometimes  Difficulty concentrating: YES- always  Changes in appetite: YES  Problems with sleep: YES  Heart racing/beating fast: No  Abnormally elevated, expansive, or irritable mood: No  Persistently increased activity or energy: No  Thoughts of hurting yourself or others: No  History:  Recent stress or major life event: normal everyday stuff, 4 kids  Prior depression or anxiety: on and off  Family history of depression or anxiety: YES- parents and siblings  Alcohol/drug use: No  Difficulty sleeping: YES  Precipitating or alleviating factors: None  Therapies tried and outcome: none      1/14/2025    11:20 AM   PHQ   PHQ-9 Total Score 15   Q9: Thoughts of better off dead/self-harm past 2 weeks Not at all         1/14/2025    11:20 AM   LIZY-7 SCORE   Total  Score 20     Patient states that she has always had issues with anxiety and depression.  She does feel very overwhelmed at times.  She does not have good coping mechanisms and usually will just shut down when symptoms get bad.  Patient denies any suicidal ideations or thoughts of herself at this time.      GERD/Heartburn  Onset/Duration: since november  Description: Feels like something is sitting on her chest, hard to take a deep breath.  Intensity: severe  Progression of Symptoms: same and waxing and waning  Accompanying Signs & Symptoms:  Does it feel like food gets stuck or trouble swallowing: No  Nausea: No  Vomiting (bloody?): No  Abdominal Pain: YES- little  Black-Tarry stools: No  Bloody stools: No  History:  Previous similar episodes: No  Previous ulcers: No  Precipitating factors:   Caffeine use: stopped  Alcohol use: No  NSAID/Aspirin use: YES  Tobacco use: No  Worse with spicy foods and caffeinated drinks.  Alleviating factors: None  Therapies tried and outcome:             Lifestyle changes: stopped caffiene            Medications: Omeprazole (Prilosec), did not help  Patient was seen in the ER in November and placed on Prilosec 1-1/2 tablets daily if bad and Carafate which did not help.  Patient states that symptoms were worse when she was drinking a lot of caffeine with her soda pop and she has discontinued this and does see some improvement without this in her system.  She denies any coughing up of coffee grounds or blood.  She has epigastric pain and tenderness that is on and off.  She also feels that there is tightness in her esophagus especially when she is eating food.  This worsens with some foods than others but is occurring with everything she eats.  Patient states that her bowel movements are normal and she does not have constipation.  Patient states that she is easily nauseated and has been dealing with pregnancy.  She also complains of intermittent bloating symptoms as well.  EKG was  "completed in the emergency room and was normal as well as other tests and chest x-ray.  Patient follows up today since medications given have not been helpful.      Tooth Pain  When did you first notice your pain? 3:00 a.m.   Have you seen anyone else for your pain? No  How has your pain affected your ability to work? Not applicable  Where in your body do you have pain?  Left side of jaw, teeth  Patient feels that she might have an abscessed tooth.  She recently got on state dental coverage and is unable to get appointment for 3 months.  The pain in her mouth is causing her to get nauseated as well which she states that she easily does.      Review of Systems  CONSTITUTIONAL: NEGATIVE for fever, chills, change in weight  ENT/MOUTH: POSITIVE for tooth pain as per HPI  RESP: NEGATIVE for significant cough or SOB  CV: NEGATIVE for chest pain, palpitations or peripheral edema  GI: POSITIVE for abdominal pain epigastric, dyspepsia, gas or bloating, and nausea  PSYCHIATRIC: POSITIVE foranxiety and depressed mood  ROS otherwise negative      Objective    BP (!) 130/90   Pulse 102   Temp 97.8  F (36.6  C) (Tympanic)   Resp 20   Ht 1.645 m (5' 4.75\")   Wt 84.3 kg (185 lb 12.8 oz)   LMP 12/27/2024 (Exact Date)   SpO2 99%   BMI 31.16 kg/m    Body mass index is 31.16 kg/m .  Physical Exam   GENERAL: alert and no distress  RESP: lungs clear to auscultation - no rales, rhonchi or wheezes  CV: regular rate and rhythm, normal S1 S2, no S3 or S4, no murmur, click or rub, no peripheral edema  ABDOMEN: soft, tenderness epigastric, no organomegaly or masses, and bowel sounds normal  PSYCH: affect normal/bright, tearful, anxious, speech pressured, and judgement and insight intact    Signed Electronically by: Radha Vanessa NP    "

## 2025-01-15 ENCOUNTER — TELEPHONE (OUTPATIENT)
Dept: GASTROENTEROLOGY | Facility: CLINIC | Age: 40
End: 2025-01-15
Payer: COMMERCIAL

## 2025-01-15 NOTE — TELEPHONE ENCOUNTER
Endoscopy Scheduling Screen      What insurance is in the chart?  Other:  [employer]    Ordering/Referring Provider: Radha Vanessa NP    (If ordering provider performs procedure, schedule with ordering provider unless otherwise instructed. )    BMI: There is no height or weight on file to calculate BMI.     Sedation Ordered  general anesthesia.   BMI<= 45 45 < BMI <= 48 48 < BMI < = 50  BMI > 50   No Restrictions No [facility]  No [facility]  [facility] with exceptions Hospital Only OR Only       Do you have a history of malignant hyperthermia?  NO    (Females) Are you currently pregnant?   NO     Are you currently on dialysis?   NO    Do you need assistance transferring?   NO    BMI: There is no height or weight on file to calculate BMI.     Is patients BMI > 50?  NO    BMI > 40?  NO    Do you have a diagnosis of diabetes?  NO    Do you take an Oral or Injectable medication for weight loss or diabetes (excluding insulin)?  NO    Do you take the medication Naltrexone?  NO    Do you take blood thinners?  NO    Prep   Are you currently have chronic kidney disease?  NO    Do you have a diagnosis of cystic fibrosis (CF)?  NO    On a regular basis do you go 3 -5 days between bowel movements?  NO    Preferred Pharmacy:    [facility] [place] [address]  [address] [address]  Phone: [phone] Fax: [phone] Alternate Fax: [phone], [phone]    [id number] - [facility] [address] [facility]  [address] [address]  Phone: [phone] Fax: [phone]    [facility] - [place], [place] - [address]   [address] [facility] [address]  Phone: [phone] Fax: [phone]      Final Scheduling Details     Procedure scheduled  Upper endoscopy (EGD)    Surgeon:  Ever      Date of procedure:  1/23/25     Location  [place] - Per order.    What is your communication  preference for Instructions and/or Bowel Prep?   [facility]    Patient Reminders:    You will receive a call from a Nurse to review instructions and health history.  This assessment must be completed prior to your procedure.  Failure to complete the Nurse assessment may result in the procedure being cancelled.       On the day of your procedure, please designate an adult(s) who can drive you home stay with you for the next 24 hours. The medicines used in the exam will make you sleepy. You will not be able to drive.       You cannot take public transportation, ride share services, or non-medical taxi service without a responsible caregiver.  Medical transport services are allowed with the requirement that a responsible caregiver will receive you at your destination.  We require that drivers and caregivers are confirmed prior to your procedure.

## 2025-01-17 ENCOUNTER — ANESTHESIA EVENT (OUTPATIENT)
Dept: GASTROENTEROLOGY | Facility: CLINIC | Age: 40
End: 2025-01-17
Payer: COMMERCIAL

## 2025-01-17 NOTE — ANESTHESIA PREPROCEDURE EVALUATION
Anesthesia Pre-Procedure Evaluation    Patient: Ninfa Caicedo   MRN: [id number] : 1985        Procedure : Procedure(s):  Esophagoscopy, gastroscopy, duodenoscopy (EGD), combined          Past Medical History:   Diagnosis Date    Allergic rhinitis due to other allergen     Chickenpox     Tendonitis     both wrists      Past Surgical History:   Procedure Laterality Date    ORTHOPEDIC SURGERY      bilat wrist      No Known Allergies   Social History     Tobacco Use    Smoking status: Never    Smokeless tobacco: Never   Substance Use Topics    Alcohol use: No      Wt Readings from Last 1 Encounters:   25 84.3 kg (185 lb 12.8 oz)        Anesthesia Evaluation   Pt has had prior anesthetic. Type: Regional and General.        ROS/MED HX  ENT/Pulmonary:     (+)           allergic rhinitis,                             Neurologic:  - neg neurologic ROS     Cardiovascular:  - neg cardiovascular ROS   (+)  - -   -  - -                                 Previous cardiac testing   Echo: Date: Results:    Stress Test:  Date: Results:    ECG Reviewed:  Date:  Results:    Sinus rhythm  Normal ECG  No previous ECGs available      Cath:  Date: Results:      METS/Exercise Tolerance:     Hematologic:  - neg hematologic  ROS     Musculoskeletal:  - neg musculoskeletal ROS     GI/Hepatic: Comment: dysphagia    (+) GERD (minimal symptoms today), Symptomatic,                  Renal/Genitourinary:  - neg Renal ROS     Endo:     (+)               Obesity,       Psychiatric/Substance Use:  - neg psychiatric ROS     Infectious Disease:     (+)   MRSA,         Malignancy:  - neg malignancy ROS     Other:  - neg other ROS          Physical Exam    Airway  airway exam normal      Mallampati: I   TM distance: > 3 FB   Neck ROM: full   Mouth opening: > 3 cm    Respiratory Devices and Support         Dental       (+) Completely normal teeth      Cardiovascular   cardiovascular exam normal       Rhythm and rate: regular and  normal     Pulmonary   pulmonary exam normal        breath sounds clear to auscultation           OUTSIDE LABS:  CBC:   Lab Results   Component Value Date    WBC 4.9 11/19/2024    WBC 4.5 10/05/2023    HGB 13.1 11/19/2024    HGB 12.1 10/05/2023    HCT 38.1 11/19/2024    HCT 36.7 10/05/2023     11/19/2024     10/05/2023     BMP:   Lab Results   Component Value Date     11/19/2024     10/05/2023    POTASSIUM 4.0 11/19/2024    POTASSIUM 4.2 10/05/2023    CHLORIDE 102 11/19/2024    CHLORIDE 107 10/05/2023    CO2 26 11/19/2024    CO2 21 (L) 10/05/2023    BUN 11.4 11/19/2024    BUN 13.0 10/05/2023    CR 0.82 11/19/2024    CR 1.01 (H) 10/05/2023    GLC 92 11/19/2024    GLC 94 10/05/2023     COAGS:   Lab Results   Component Value Date    PTT 26 07/11/2013    INR 1.01 07/11/2013     POC:   Lab Results   Component Value Date    HCG Negative 04/07/2016    HCGS Negative 08/05/2023     HEPATIC:   Lab Results   Component Value Date    ALBUMIN 4.5 11/19/2024    PROTTOTAL 7.7 11/19/2024    ALT 30 11/19/2024    AST 32 11/19/2024    ALKPHOS 75 11/19/2024    BILITOTAL <0.2 11/19/2024     OTHER:   Lab Results   Component Value Date    LACT 0.6 04/08/2016    RUBÉN 9.6 11/19/2024    LIPASE 38 08/05/2023    TSH 2.90 11/19/2024    SED 10 10/05/2023       Anesthesia Plan    ASA Status:  2    NPO Status:  NPO Appropriate    Anesthesia Type: General.     - Airway: Native airway   Induction: Propofol.           Consents    Anesthesia Plan(s) and associated risks, benefits, and realistic alternatives discussed. Questions answered and patient/representative(s) expressed understanding.     - Discussed: Risks, Benefits and Alternatives for BOTH SEDATION and the PROCEDURE were discussed     - Discussed with:  Patient      - Extended Intubation/Ventilatory Support Discussed: No.      - Patient is DNR/DNI Status: No     Use of blood products discussed: No .     Postoperative Care    Pain management: IV analgesics.   PONV  "prophylaxis: Background Propofol Infusion     Comments:               VIRAJ Francois CRNA    I have reviewed the pertinent notes and labs in the chart from the past 30 days and (re)examined the patient.  Any updates or changes from those notes are reflected in this note.                         # Obesity: Estimated body mass index is 31.16 kg/m  as calculated from the following:    Height as of 1/14/25: 1.645 m (5' 4.75\").    Weight as of 1/14/25: 84.3 kg (185 lb 12.8 oz).             "

## 2025-01-20 ENCOUNTER — NURSE TRIAGE (OUTPATIENT)
Dept: FAMILY MEDICINE | Facility: CLINIC | Age: 40
End: 2025-01-20
Payer: COMMERCIAL

## 2025-01-20 DIAGNOSIS — K04.7 ABSCESSED TOOTH: ICD-10-CM

## 2025-01-20 RX ORDER — AMOXICILLIN 500 MG/1
500 CAPSULE ORAL 2 TIMES DAILY
Qty: 14 CAPSULE | Refills: 0 | Status: CANCELLED | OUTPATIENT
Start: 2025-01-20

## 2025-01-20 NOTE — TELEPHONE ENCOUNTER
Referral to Acute and Diagnostic Services    [phone] ([place]) [place] [address] [place] [id number]    Transition to Acute & Diagnostic Services Clinic has been discussed with patient, and she agrees with next level of care.   Patient understands that evaluation/treatment at [facility] typically takes significantly longer than in clinic/urgent care (>2 hours).  The [facility] Acute and Diagnostics Services Clinic has been contacted by provider/staff to confirm patient acceptance.     Special issues: None    The following provider has assessed this patient for intervention at [facility], and directed the patient for referral: Irma Oglesby RN

## 2025-01-20 NOTE — TELEPHONE ENCOUNTER
"I talked to the patient and she still feels the infection in her tooth is still there.  She has one more dose of antibiotic.  She was seen in clinic for this on 1/14.  If she does not take ibuprofen her tooth is still painful.  Her Dentist refused her \"until infection is gone.\"  Per visit with Radha Vanessa pt is to be seen again if any recurring symptoms.  Clinic appt made for tomorrow.  "

## 2025-01-20 NOTE — TELEPHONE ENCOUNTER
Pending Prescriptions:                       Disp   Refills    amoxicillin (AMOXIL) 500 MG capsule       14 cap*0            Sig: Take 1 capsule (500 mg) by mouth 2 times daily.      Pt states the infection in her tooth is not quite gone.  Wonders if she can get a refill of the antibiotic.    Kiki Calero on 1/20/2025 at 2:32 PM

## 2025-01-20 NOTE — TELEPHONE ENCOUNTER
"Nurse Triage SBAR    Is this a 2nd Level Triage? YES, LICENSED PRACTITIONER REVIEW IS REQUIRED    Situation: Patient continues having pain, swelling, and tooth drainage despite antibiotic treatment.     Background: Patient was seen on 1/14/2025 for abscess tooth of lower, left, back jaw. Patient was given 10 tablets of Vicodin for pain and amoxicillin 500 mg twice a day for her infection in her tooth for 7 days. Follow-up was recommended if any recurring symptoms until she get into the dentist. Patient has attempted to call 3 covering dentists and all 3 said they will not see her until the infection has gone away. Patient made appointment in clinic with primary care provider. Primary care provider had RN call to triage patient as recommended follow-up treatment could include a loading dose of IV ceftriaxone since failing oral antibiotics. Patient's last dose of oral abx is on 1/20/2025.    Assessment: patient reports 4/10 pain. She is taking Ibuprofen regularly to help with swelling and pain. Patient states swelling of her left lower jaw is visible. Swelling is about the size of a marble. Denies fever or chills. Reports tasting \"drainage\" in her mouth.    Protocol Recommended Disposition:   Go To Office Now    Recommendation: Go to [facility] next day.     Huddled with ADS provider who is agreeable for next day referral.    Does the patient meet one of the following criteria for ADS visit consideration? 16+ years old, no PCP (internal or external) but seen at [facility]     Reason for Disposition   Face is swollen    Answer Assessment - Initial Assessment Questions  1. LOCATION: Lower, back, left jaw  2. ONSET: 1/14/2025  3. SEVERITY: 4/10  4. SWELLING: \"Yes  5. OTHER SYMPTOMS: Denies  6. PREGNANCY: Denies    Protocols used: Toothache-A-[place]    Irma WHITMORE RN  [facility]  [phone]   "

## 2025-01-21 ENCOUNTER — HOSPITAL ENCOUNTER (OUTPATIENT)
Dept: CT IMAGING | Facility: CLINIC | Age: 40
Discharge: HOME | End: 2025-01-21
Payer: COMMERCIAL

## 2025-01-21 ENCOUNTER — OFFICE VISIT (OUTPATIENT)
Dept: PEDIATRICS | Facility: CLINIC | Age: 40
End: 2025-01-21
Payer: COMMERCIAL

## 2025-01-21 VITALS
TEMPERATURE: 98.2 F | BODY MASS INDEX: 30.82 KG/M2 | RESPIRATION RATE: 16 BRPM | HEART RATE: 80 BPM | DIASTOLIC BLOOD PRESSURE: 69 MMHG | OXYGEN SATURATION: 99 % | HEIGHT: 65 IN | WEIGHT: 185 LBS | SYSTOLIC BLOOD PRESSURE: 126 MMHG

## 2025-01-21 DIAGNOSIS — R22.0 MANDIBULAR SWELLING: Primary | ICD-10-CM

## 2025-01-21 DIAGNOSIS — K04.7 PERIAPICAL ABSCESS: ICD-10-CM

## 2025-01-21 PROCEDURE — 250N000011 HC RX IP 250 OP 636

## 2025-01-21 PROCEDURE — 250N000009 HC RX 250

## 2025-01-21 PROCEDURE — 99214 OFFICE O/P EST MOD 30 MIN: CPT

## 2025-01-21 PROCEDURE — 70487 CT MAXILLOFACIAL W/DYE: CPT

## 2025-01-21 RX ORDER — IOPAMIDOL 755 MG/ML
67 INJECTION, SOLUTION INTRAVASCULAR ONCE
Status: COMPLETED | OUTPATIENT
Start: 2025-01-21 | End: 2025-01-21

## 2025-01-21 RX ADMIN — IOPAMIDOL 67 ML: 755 INJECTION, SOLUTION INTRAVENOUS at 11:08

## 2025-01-21 RX ADMIN — SODIUM CHLORIDE 80 ML: 9 INJECTION, SOLUTION INTRAVENOUS at 11:09

## 2025-01-21 ASSESSMENT — PAIN SCALES - GENERAL: PAINLEVEL_OUTOF10: NO PAIN (0)

## 2025-01-21 NOTE — PROGRESS NOTES
"Acute and Diagnostic Services Clinic Visit    Assessment & Plan     (R22.0) Left mandibular swelling  (primary encounter diagnosis)  Comment: Has had swelling of the left mandible primarily at the left mandibular angle since 1/14/2025.  Presumptive diagnosis made in a 1/14/2025 office visit was that of periapical abscess, though imaging was not performed.  There has been only mild clinical improvement following 1 week of amoxicillin 500 mg.  Ninfa has contacted 3 dentist regarding extraction, though all of them have told her that the \"infection must be cleared up\" before they will consider extraction.  Ninfa has not had fever, and has had only mild pain; she stopped using the hydrocodone given to her 1/14/2025 as it was not necessary.  Exam shows dentition in the area of mandibular swelling to be in poor repair, including a premolar that has broken off and is flush with the gumline.  This is likely to be a periapical abscess, though sialoadenitis is also a consideration.  I think that we should perform facial bone CT today to confirm the diagnosis of periapical abscess, and to see if there is any evidence of complication such as osteomyelitis or osteonecrosis of the jaw which would probably mandate parenteral antibiotic therapy.  Plan:  Discussed the purpose of facial CT with Ninfa today; she readily agrees to proceed.  CT of the facial bones without contrast ordered.    DISCUSSION/MEDICAL DECISION MAKING:    (K04.7) Multiple periapical abscesses  Comment: CT of the facial bones demonstrated multiple dental carious and periapical lucencies of the maxillary and mandibular teeth consistent with periapical abscesses.  There was also some asymmetric fat stranding in the left buccal space adjacent to the left mandibular alveolus, concerning for cellulitis.  The radiologist went on to state that there was no definite drainable fluid collection at this time.  Plan:  I let Ninfa know that she has multiple dental " "abscesses on the left side, of both the maxilla and mandible.  This is cellulitis I described as an extension of 1 or more of these periapical abscesses into the adjacent soft tissues, not a separate infection.  I do not think Sayda's lack of response to amoxicillin 500 mg p.o. twice daily was a true treatment failure.  However, I do think that she would be more likely to respond to a higher dose and longer antibiotic duration.  Specifically, I suggested to Ninfa that we use amoxicillin/clavulanate 875/125 mg p.o. twice daily for 14 days.  Ninfa will continue her search for a dentist to perform dental extractions.  As described above, her associated pain is mild, and she has not had to use the small supply of hydrocodone that she has at home.  Therefore, no additional analgesia was prescribed.    37 minutes were spent doing chart review, history and exam, documentation and further activities per the note.       BMI  Estimated body mass index is 31.02 kg/m  as calculated from the following:    Height as of this encounter: 1.645 m (5' 4.75\").    Weight as of this encounter: 83.9 kg (185 lb).     Bandar Ocasoi is a 39 year old, presenting for the following health issues:  Mouth/Lip Problem    HPI     Acute Illness  Acute illness concerns: left sided facial swelling and pain  Onset/Duration: last tuesday  Symptoms:  Fever: No  Chills/Sweats: No  Headache (location?): No  Sinus Pressure: No           Decreased energy level: No  Conjunctivitis:  No  Ear Pain: no  Rhinorrhea: No  Congestion: No  Sore Throat: No  Cough: no  Wheeze: No           Breathing fast: No  Decreased Appetite/Intake: No  Nausea: YES  Vomiting: YES-   Diarrhea: No  Genitourinary symptoms: No  Progression of symptoms: better  Sick/Strep Exposure: No  Therapies tried and outcome: antibiotic for seven days ,was to see pcp for this on 01/14/2025    Ninfa Caicedo is a 39-year-old woman seen today in the Acute Diagnostic Services (ADS) " "clinic at [facility] regarding left mandibular swelling of 1 week duration.    Ninfa awoke on the morning of 1/14/2025 with nausea and vomiting.  She looked in the mirror and noticed marked swelling at the left mandibular angle (she showed me a photo on her cell phone).  She was seen in an office visit 1/14/2025, and was diagnosed as having a periapical abscess, though imaging was not performed.  She was prescribed amoxicillin 500 mg p.o. twice daily for 7 days and given a small supply of hydrocodone.  By 1/19/2024, Ninfa said that the left mandibular swelling was \"almost gone\".  However, the next morning, 1/20/2024, she noted a return of left mandibular swelling, not quite as severe as it was upon symptom onset, though significantly worse than the previous day.  She contacted her primary care office, and was contacted by PCP the next day, 1/21/2025 (today).  On the basis that call, she was referred to ADS today for evaluation.    Ninfa has dentition in poor repair, as she was without dental insurance for a period of 5 years.  She does have a chronically broken tooth on the left mandibular surface in the region of the recent onset swelling, though she says that that tooth has been broken for years, and has not caused prior problems.  She has not had fever throughout this illness.  She has a \"nasty\" taste in her mouth.  If she pushes on the skin in the area of swelling, she says that she can express a little fluid into her oral cavity\".  She had some initial pain associated with the swelling, and took 2 days of hydrocodone, but has stopped taking hydrocodone as pain has been mild ever since.      Review of Systems  As per the history of present illness.  No additional positives or negatives are obtained.      Objective    /69   Pulse 80   Temp 98.2  F (36.8  C) (Oral)   Resp 16   Ht 1.645 m (5' 4.75\")   Wt 83.9 kg (185 lb)   LMP 12/27/2024 (Exact Date)   SpO2 99%   BMI 31.02 kg/m    Body " mass index is 31.02 kg/m .  Physical Exam   GENERAL: Very pleasant, animated woman, who looks well.  EYES: Eyes grossly normal to inspection, extraocular movements intact.  HENT: Nares patent bilaterally.  Nasal mucosa normal, no discharge.  Dentition is in poor repair.  There is a broken tooth which looks like a premolar, on the mandibular surface on the left, with the remaining tooth flush with the gingival surface.  This area of gingiva is slightly erythematous, though not grossly purulent.  There is a palpable swelling overlying the left mandibular angle and slightly extending submandibular, firm but not hard.  With palpation of the area of swelling, I do not express any discharge into the oral cavity.  Posterior pharynx is clear, without lesion or exudate.  Palpation of the area of mandibular swelling elicits no more than mild discomfort.  NEURO: Alert, oriented, conversant.  Cranial nerves II - XII grossly intact.  No gross motor or sensory deficits.  PSYCH: Calm, alert, conversant.  Able to articulate logical thoughts, no tangential thoughts, no hallucinations or delusions.  Affect normal.    EXAM: CT FACIAL BONES WITH CONTRAST  LOCATION: [facility]  DATE: 1/21/2025     INDICATION: Left mandibular swelling of 1 week duration. This may be a periapical abscess based upon adjacent tooth decay, but please evaluate for sialoadenitis as well.  COMPARISON: Soft tissue neck CT 03/20/2019.  CONTRAST: 67 mL Isovue 370  TECHNIQUE: Routine CT Maxillofacial with IV contrast. Multiplanar reformats. Dose reduction techniques were used.      FINDINGS: Multiple dental caries of the maxillary and mandibular teeth. Periapical lucencies of the right maxillary first and second molars, the right mandibular first and second molars, and the left mandibular second premolar and the left mandibular   first and second molars. These are concerning for periapical abscesses. There is abnormal fat stranding in  the left buccal space and along the lateral margin of the left mandibular alveolus, concerning for cellulitis given the patient's clinical history.   No definite drainable fluid collection/abscess is identified.     The visualized intracranial contents and orbits appear grossly unremarkable. The paranasal sinuses are free of significant disease. The mastoid and middle ear cavities appear clear. Normal appearance of the  and parapharyngeal spaces. Mild   symmetric prominence of the palatine tonsils, which may be physiologic/reactive. Punctate left-sided palatine tonsillolith. A few scattered prominent upper cervical lymph nodes are not specific, but presumably reactive. There is mild posterior tilting of   the tip of the odontoid process, resulting in at least mild upper cervical spinal canal stenosis.                                                                      IMPRESSION:  1. Multiple dental carious and periapical lucencies of the maxillary and mandibular teeth, as described, concerning for periapical abscesses.  2. Asymmetric fat stranding in the left buccal space and adjacent to the left mandibular alveolus, concerning for cellulitis. No definite drainable fluid collection is identified in the soft tissues.  3. Additional incidental findings, as described.          In 1009  Out 1033  In 1149  Out 1203    Signed Electronically by: Jaun Luevano MD

## 2025-01-21 NOTE — PATIENT INSTRUCTIONS
As we discussed, you actually have multiple abscessed teeth on both upper and lower jaw on the left side.  You also have an area of soft tissue adjacent to the left side of the jaw that is an extension of 1 or more of these dental abscesses, something called cellulitis.  All of this should be treatable with a higher dose antibiotic and for a longer antibiotic duration, though in the end I agree that you are going to require dental extraction.    I have prescribed an antibiotic called amoxicillin/clavulanate 875/125 mg tablets, please take 1 tablet twice daily.  I prescribed a 14-day course, which I think will be necessary based upon the number of teeth involved.    In the meantime, please continue your search for a dentist to eventually perform tooth extraction.    Should you start to feel worse, should the swelling become worse, or should you develop fever, please contact your primary care clinic to report this, as reevaluation may be required.    It was a pleasure meeting you today.  I hope you find a solution to these abscesses soon.

## 2025-01-21 NOTE — Clinical Note
I had the pleasure of seeing Ninfa today in the [place] [facility] regarding her left facial swelling.  We performed CT imaging of the face, which confirms multiple periapical abscesses, as well as extension into the soft tissues of the left buccal space consistent with cellulitis.  There was no drainable abscess found.  I have prescribed additional antibiotics, though decided to use a higher dose of amoxicillin, specifically amoxicillin/clavulanate 875/1 and 25 mg p.o. twice daily, and I extended her course to 14 days given the number of teeth involved.  Ninfa is going to continue her search for a dentist to perform definitive extraction.  She agrees to call you if she starts feeling worse, or should fever develop.  Thank you.

## 2025-01-23 ENCOUNTER — ANESTHESIA (OUTPATIENT)
Dept: GASTROENTEROLOGY | Facility: CLINIC | Age: 40
End: 2025-01-23
Payer: COMMERCIAL

## 2025-01-23 ENCOUNTER — HOSPITAL ENCOUNTER (OUTPATIENT)
Facility: CLINIC | Age: 40
Discharge: HOME | End: 2025-01-23
Attending: STUDENT IN AN ORGANIZED HEALTH CARE EDUCATION/TRAINING PROGRAM | Admitting: STUDENT IN AN ORGANIZED HEALTH CARE EDUCATION/TRAINING PROGRAM
Payer: COMMERCIAL

## 2025-01-23 VITALS
OXYGEN SATURATION: 100 % | RESPIRATION RATE: 16 BRPM | HEIGHT: 65 IN | TEMPERATURE: 98.3 F | HEART RATE: 71 BPM | WEIGHT: 185 LBS | BODY MASS INDEX: 30.82 KG/M2 | SYSTOLIC BLOOD PRESSURE: 118 MMHG | DIASTOLIC BLOOD PRESSURE: 88 MMHG

## 2025-01-23 LAB — UPPER GI ENDOSCOPY: NORMAL

## 2025-01-23 PROCEDURE — 250N000009 HC RX 250: Performed by: PHYSICIAN ASSISTANT

## 2025-01-23 PROCEDURE — 370N000017 HC ANESTHESIA TECHNICAL FEE, PER MIN: Performed by: STUDENT IN AN ORGANIZED HEALTH CARE EDUCATION/TRAINING PROGRAM

## 2025-01-23 PROCEDURE — 258N000003 HC RX IP 258 OP 636: Performed by: STUDENT IN AN ORGANIZED HEALTH CARE EDUCATION/TRAINING PROGRAM

## 2025-01-23 PROCEDURE — 88305 TISSUE EXAM BY PATHOLOGIST: CPT | Mod: 26 | Performed by: PATHOLOGY

## 2025-01-23 PROCEDURE — 43239 EGD BIOPSY SINGLE/MULTIPLE: CPT | Performed by: STUDENT IN AN ORGANIZED HEALTH CARE EDUCATION/TRAINING PROGRAM

## 2025-01-23 PROCEDURE — 88305 TISSUE EXAM BY PATHOLOGIST: CPT | Mod: TC | Performed by: STUDENT IN AN ORGANIZED HEALTH CARE EDUCATION/TRAINING PROGRAM

## 2025-01-23 PROCEDURE — 250N000009 HC RX 250: Performed by: NURSE ANESTHETIST, CERTIFIED REGISTERED

## 2025-01-23 PROCEDURE — 250N000011 HC RX IP 250 OP 636: Performed by: NURSE ANESTHETIST, CERTIFIED REGISTERED

## 2025-01-23 RX ORDER — SODIUM CHLORIDE, SODIUM LACTATE, POTASSIUM CHLORIDE, CALCIUM CHLORIDE 600; 310; 30; 20 MG/100ML; MG/100ML; MG/100ML; MG/100ML
INJECTION, SOLUTION INTRAVENOUS CONTINUOUS
Status: DISCONTINUED | OUTPATIENT
Start: 2025-01-23 | End: 2025-01-23 | Stop reason: HOSPADM

## 2025-01-23 RX ORDER — ONDANSETRON 2 MG/ML
4 INJECTION INTRAMUSCULAR; INTRAVENOUS EVERY 30 MIN PRN
Status: DISCONTINUED | OUTPATIENT
Start: 2025-01-23 | End: 2025-01-23 | Stop reason: HOSPADM

## 2025-01-23 RX ORDER — NALOXONE HYDROCHLORIDE 0.4 MG/ML
0.1 INJECTION, SOLUTION INTRAMUSCULAR; INTRAVENOUS; SUBCUTANEOUS
Status: DISCONTINUED | OUTPATIENT
Start: 2025-01-23 | End: 2025-01-23 | Stop reason: HOSPADM

## 2025-01-23 RX ORDER — ALBUTEROL SULFATE 0.83 MG/ML
2.5 SOLUTION RESPIRATORY (INHALATION) EVERY 4 HOURS PRN
Status: DISCONTINUED | OUTPATIENT
Start: 2025-01-23 | End: 2025-01-23 | Stop reason: HOSPADM

## 2025-01-23 RX ORDER — ONDANSETRON 4 MG/1
4 TABLET, ORALLY DISINTEGRATING ORAL EVERY 30 MIN PRN
Status: DISCONTINUED | OUTPATIENT
Start: 2025-01-23 | End: 2025-01-23 | Stop reason: HOSPADM

## 2025-01-23 RX ORDER — LIDOCAINE 40 MG/G
CREAM TOPICAL
Status: DISCONTINUED | OUTPATIENT
Start: 2025-01-23 | End: 2025-01-23 | Stop reason: HOSPADM

## 2025-01-23 RX ORDER — PROPOFOL 10 MG/ML
INJECTION, EMULSION INTRAVENOUS PRN
Status: DISCONTINUED | OUTPATIENT
Start: 2025-01-23 | End: 2025-01-23

## 2025-01-23 RX ORDER — LIDOCAINE HYDROCHLORIDE 20 MG/ML
INJECTION, SOLUTION INFILTRATION; PERINEURAL PRN
Status: DISCONTINUED | OUTPATIENT
Start: 2025-01-23 | End: 2025-01-23

## 2025-01-23 RX ADMIN — PROPOFOL 40 MG: 10 INJECTION, EMULSION INTRAVENOUS at 10:57

## 2025-01-23 RX ADMIN — SODIUM CHLORIDE, POTASSIUM CHLORIDE, SODIUM LACTATE AND CALCIUM CHLORIDE: 600; 310; 30; 20 INJECTION, SOLUTION INTRAVENOUS at 10:40

## 2025-01-23 RX ADMIN — LIDOCAINE HYDROCHLORIDE 100 MG: 20 INJECTION, SOLUTION INFILTRATION; PERINEURAL at 10:51

## 2025-01-23 RX ADMIN — LIDOCAINE HYDROCHLORIDE 0.1 ML: 10 INJECTION, SOLUTION EPIDURAL; INFILTRATION; INTRACAUDAL; PERINEURAL at 10:40

## 2025-01-23 RX ADMIN — PROPOFOL 50 MG: 10 INJECTION, EMULSION INTRAVENOUS at 10:53

## 2025-01-23 RX ADMIN — PROPOFOL 30 MG: 10 INJECTION, EMULSION INTRAVENOUS at 10:55

## 2025-01-23 RX ADMIN — PROPOFOL 120 MG: 10 INJECTION, EMULSION INTRAVENOUS at 10:51

## 2025-01-23 ASSESSMENT — ACTIVITIES OF DAILY LIVING (ADL)
ADLS_ACUITY_SCORE: 41
ADLS_ACUITY_SCORE: 41

## 2025-01-23 NOTE — ANESTHESIA CARE TRANSFER NOTE
Patient: Ninfa Caicedo    Procedure: Procedure(s):  ESOPHAGOGASTRODUODENOSCOPY, WITH BIOPSY       Diagnosis: Pharyngoesophageal dysphagia [R13.14]  Gastroesophageal reflux disease with esophagitis without hemorrhage [K21.00]  Diagnosis Additional Information: No value filed.    Anesthesia Type:   General     Note:    Oropharynx: oropharynx clear of all foreign objects  Level of Consciousness: drowsy  Oxygen Supplementation: room air    Independent Airway: airway patency satisfactory and stable  Dentition: dentition unchanged  Vital Signs Stable: post-procedure vital signs reviewed and stable  Report to RN Given: handoff report given  Patient transferred to: Phase II    Handoff Report: Identifed the Patient, Identified the Reponsible Provider, Reviewed the pertinent medical history, Discussed the surgical course, Reviewed Intra-OP anesthesia mangement and issues during anesthesia, Set expectations for post-procedure period and Allowed opportunity for questions and acknowledgement of understanding      Vitals:  Vitals Value Taken Time   /80 01/23/25 1105   Temp 36.8  C (98.3  F) 01/23/25 1105   Pulse 77 01/23/25 1105   Resp 16 01/23/25 1105   SpO2 98 % 01/23/25 1108   Vitals shown include unfiled device data.    Electronically Signed By: VIRAJ Wilkes CRNA  January 23, 2025  11:10 AM

## 2025-01-23 NOTE — INTERVAL H&P NOTE
"I have reviewed the surgical (or preoperative) H&P that is linked to this encounter, and examined the patient. There are no significant changes    Clinical Conditions Present on Arrival:  Clinically Significant Risk Factors Present on Admission                       # Obesity: Estimated body mass index is 31.02 kg/m  as calculated from the following:    Height as of this encounter: 1.645 m (5' 4.75\").    Weight as of this encounter: 83.9 kg (185 lb).       "

## 2025-01-23 NOTE — LETTER
Ninfa Caicedo  [address] [facility] [address]  January 27, 2025    Dear Ninfa,   This letter is to inform you of the results of your pathology report on your upper endoscopy (EGD).    Your pathology report was:  Showed findings consistent with reflux (heartburn).  Showed findings consistent with a mildly inflamed stomach. If you continue to have symptoms please follow up with your primary care doctor or with myself.    If you have any questions or concerns please do not hesitate to call my office at [phone].  Sincerely,     Mukund Curtis MD  [facility] Surgery        Resulted Orders   Surgical Pathology Exam   Result Value Ref Range    Case Report       Surgical Pathology Report                         Case: [id number]                                  Authorizing Provider:  Mukund Curtis MD       Collected:           01/23/2025 10:53 AM          Ordering Location:     [facility]   Received:            01/23/2025 11:08 AM                                 [place]                                                                      Pathologist:           Tunde Yoo MD                                                          Specimens:   A) - Stomach, Pyloric Antrum                                                                        B) - Esophagus, Distal                                                                     Final Diagnosis       A(1).  Stomach, antrum, biopsy  - Antral type gastric mucosa with mild chronic inflammation.  - Negative for H. Pylori organisms on routine stains.  - Negative for intestinal metaplasia.   -Negative for dysplasia or malignancy      B(2). Esophagus, distal, biopsy:  -Squamous mucosa with mild reactive epithelial changes of the type seen in reflux esophagitis  -Negative for intestinal metaplasia.  -Negative for acute or eosinophilic esophagitis.  -Negative for dysplasia or malignancy.          Clinical Information        Procedure:  ESOPHAGOGASTRODUODENOSCOPY, WITH BIOPSY  Pre-op Diagnosis: Pharyngoesophageal dysphagia [R13.14]  Gastroesophageal reflux disease with esophagitis without hemorrhage [K21.00]  Post-op Diagnosis: R13.14 - Pharyngoesophageal dysphagia [ICD-10-CM]  K21.00 - Gastroesophageal reflux disease with esophagitis without hemorrhage [ICD-10-CM]      Gross Description       A(1). Stomach, Pyloric Antrum, :  The specimen is received in formalin, labeled with the patient's name, medical record number and other identifying information and designated  stomach, pyloric antrum . It consists of a single tan soft tissue fragment measuring 0.3 cm. Entirely submitted in one cassette.    B(2). Esophagus, Distal, :  The specimen is received in formalin, labeled with the patient's name, medical record number and other identifying information and designated  distal esophagus . It consists of 2 tan soft tissue fragments ranging from 0.2-0.3 cm. Entirely submitted in one cassette.   ARELY Varner([facility]) 1/23/2025 2:44 PM       Microscopic Description       Microscopic examination was performed.        Performing Labs       The technical component of this testing was completed at [facility].    Stain controls for all stains resulted within this report have been reviewed and show appropriate reactivity.       Case Images

## 2025-01-23 NOTE — ANESTHESIA POSTPROCEDURE EVALUATION
Patient: Ninfa Caicedo    Procedure: Procedure(s):  ESOPHAGOGASTRODUODENOSCOPY, WITH BIOPSY       Anesthesia Type:  General    Note:  Disposition: Outpatient   Postop Pain Control: Uneventful            Sign Out: Well controlled pain   PONV: No   Neuro/Psych: Uneventful            Sign Out: Acceptable/Baseline neuro status   Airway/Respiratory: Uneventful            Sign Out: Acceptable/Baseline resp. status   CV/Hemodynamics: Uneventful            Sign Out: Acceptable CV status; No obvious hypovolemia; No obvious fluid overload   Other NRE: NONE   DID A NON-ROUTINE EVENT OCCUR? No           Last vitals:  Vitals Value Taken Time   /88 01/23/25 1130   Temp 36.8  C (98.3  F) 01/23/25 1105   Pulse 71 01/23/25 1130   Resp 16 01/23/25 1105   SpO2 100 % 01/23/25 1132   Vitals shown include unfiled device data.    Electronically Signed By: VIRAJ Mcacbe CRNA  January 23, 2025  4:41 PM

## 2025-01-24 LAB
PATH REPORT.COMMENTS IMP SPEC: NORMAL
PATH REPORT.COMMENTS IMP SPEC: NORMAL
PATH REPORT.FINAL DX SPEC: NORMAL
PATH REPORT.GROSS SPEC: NORMAL
PATH REPORT.MICROSCOPIC SPEC OTHER STN: NORMAL
PATH REPORT.RELEVANT HX SPEC: NORMAL
PHOTO IMAGE: NORMAL

## 2025-02-21 ENCOUNTER — HOSPITAL ENCOUNTER (EMERGENCY)
Facility: CLINIC | Age: 40
Discharge: HOME | End: 2025-02-21
Attending: EMERGENCY MEDICINE | Admitting: EMERGENCY MEDICINE
Payer: COMMERCIAL

## 2025-02-21 ENCOUNTER — APPOINTMENT (OUTPATIENT)
Dept: ULTRASOUND IMAGING | Facility: CLINIC | Age: 40
End: 2025-02-21
Attending: EMERGENCY MEDICINE
Payer: COMMERCIAL

## 2025-02-21 VITALS
RESPIRATION RATE: 18 BRPM | BODY MASS INDEX: 31.02 KG/M2 | WEIGHT: 185 LBS | OXYGEN SATURATION: 98 % | SYSTOLIC BLOOD PRESSURE: 115 MMHG | TEMPERATURE: 98.3 F | DIASTOLIC BLOOD PRESSURE: 77 MMHG | HEART RATE: 81 BPM

## 2025-02-21 DIAGNOSIS — I83.93 VARICOSE VEINS OF BOTH LOWER EXTREMITIES WITHOUT ULCER OR INFLAMMATION: ICD-10-CM

## 2025-02-21 DIAGNOSIS — M79.662 PAIN OF LEFT LOWER LEG: ICD-10-CM

## 2025-02-21 PROCEDURE — 99284 EMERGENCY DEPT VISIT MOD MDM: CPT | Mod: 25 | Performed by: EMERGENCY MEDICINE

## 2025-02-21 PROCEDURE — 93971 EXTREMITY STUDY: CPT | Mod: LT

## 2025-02-21 PROCEDURE — 99283 EMERGENCY DEPT VISIT LOW MDM: CPT | Performed by: EMERGENCY MEDICINE

## 2025-02-21 ASSESSMENT — COLUMBIA-SUICIDE SEVERITY RATING SCALE - C-SSRS
2. HAVE YOU ACTUALLY HAD ANY THOUGHTS OF KILLING YOURSELF IN THE PAST MONTH?: NO
6. HAVE YOU EVER DONE ANYTHING, STARTED TO DO ANYTHING, OR PREPARED TO DO ANYTHING TO END YOUR LIFE?: NO
1. IN THE PAST MONTH, HAVE YOU WISHED YOU WERE DEAD OR WISHED YOU COULD GO TO SLEEP AND NOT WAKE UP?: NO

## 2025-02-21 ASSESSMENT — ACTIVITIES OF DAILY LIVING (ADL)
ADLS_ACUITY_SCORE: 41
ADLS_ACUITY_SCORE: 41

## 2025-02-21 NOTE — ED PROVIDER NOTES
ED Provider Note  [facility]      History     Chief Complaint   Patient presents with    Leg Pain     HPI  Ninfa Caicedo is a 39 year old female who presents to the emergency department with concerns regarding left lower extremity pain.  Patient does have history of varicose veins that often will bother her during softball time, when patient is more active.  However, today, patient went outside to grab a package, and subsequently upon returning inside felt popping sensation of the left lateral aspect of the proximal lower leg just below the knee.  This caused immediate pain, and also bruising/discoloration.  Has ongoing painful area of bruising.  No issues elsewhere.  No prior history of DVT.        Independent Historian:        Review of External Notes:          Allergies:  No Known Allergies    Problem List:    Patient Active Problem List    Diagnosis Date Noted    Pyelonephritis 04/08/2016     Priority: Medium    Vaginal delivery 06/10/2014     Priority: Medium    Encounter for supervision of other normal pregnancy 03/17/2014     Priority: Medium     Diagnosis updated by automated process. Provider to review and confirm.      Secondary physiologic amenorrhea 09/25/2013     Priority: Medium     Negative HCG  Normal abdominal limited US  Ordered TSH/FSH/Prolactin  Progesterone withdrawal bleed      MRSA 03/17/2008     Priority: Medium     MRSA Positive - R Arm Wound/Boil 3/7/2008.  Swab negative 3/14  Swab negative 6/14        Radial styloid tenosynovitis 04/12/2005     Priority: Medium        Past Medical History:    Past Medical History:   Diagnosis Date    Allergic rhinitis due to other allergen     Chickenpox     Tendonitis        Past Surgical History:    Past Surgical History:   Procedure Laterality Date    ESOPHAGOSCOPY, GASTROSCOPY, DUODENOSCOPY (EGD), COMBINED N/A 1/23/2025    Procedure: ESOPHAGOGASTRODUODENOSCOPY, WITH BIOPSY;  Surgeon: Mukund Curtis MD;  Location: [facility]     ORTHOPEDIC SURGERY      bilat wrist       Family History:    Family History   Problem Relation Age of Onset    Psychotic Disorder Father         depression    Depression Father     Prostate Cancer Maternal Grandfather     Diabetes Maternal Grandfather     Gastrointestinal Disease Maternal Grandfather         Hepatitis C-blood transfusion    Cardiovascular Paternal Grandfather         pacemaker    Hypertension Mother        Social History:  Marital Status:  Single [1]  Social History     Tobacco Use    Smoking status: Never    Smokeless tobacco: Never   Vaping Use    Vaping status: Never Used   Substance Use Topics    Alcohol use: No    Drug use: No        Medications:    citalopram (CELEXA) 20 MG tablet  pantoprazole (PROTONIX) 40 MG EC tablet  UNABLE TO FIND          Review of Systems  A medically appropriate review of systems was performed with pertinent positives and negatives noted in the HPI, and all other systems negative.    Physical Exam   Patient Vitals for the past 24 hrs:   BP Temp Temp src Pulse Resp SpO2 Weight   02/21/25 1705 115/77 -- -- -- -- 98 % --   02/21/25 1650 135/86 -- -- 81 -- 99 % --   02/21/25 1649 135/86 98.3  F (36.8  C) Oral 85 18 99 % 83.9 kg (185 lb)          Physical Exam  General: alert and in no acute distress on arrival  Head: atraumatic, normocephalic  Lungs:  nonlabored  CV:  extremities warm and perfused  Abd: nondistended  Skin: no rashes area of the left lateral aspect of the left leg just distal to the knee with slight area of induration with discoloration with bruising/dark in color which is present, measuring approximately 3 cm in diameter  Neuro: Patient awake, alert, speech is fluent,   Psychiatric: affect/mood normal,        ED Course                 Procedures                 None         Results for orders placed or performed during the hospital encounter of 02/21/25 (from the past 24 hours)   US Lower Extremity Venous Duplex Left    Narrative    EXAM: US LOWER  EXTREMITY VENOUS DUPLEX LEFT  LOCATION: [facility]  DATE: 2/21/2025    INDICATION: pain and swelling.  h o varicose veins.  popping sensation and pain  COMPARISON: None.  TECHNIQUE: Venous Duplex ultrasound of the left lower extremity with and without compression, augmentation and duplex. Color flow and spectral Doppler with waveform analysis performed.    FINDINGS: Exam includes the common femoral, femoral, popliteal, and contralateral common femoral veins as well as segmentally visualized deep calf veins and greater saphenous vein.     LEFT: No deep vein thrombosis. No superficial thrombophlebitis. No popliteal cyst.      Impression    IMPRESSION:  1.  No deep venous thrombosis in the left lower extremity.       MEDICATIONS GIVEN IN THE EMERGENCY DEPARTMENT:  Medications - No data to display        Independent Interpretation (X-rays, CTs, rhythm strip):  None    Consultations/Discussion of Management or Tests:         Social Determinants of Health affecting care:         Assessments & Plan (with Medical Decision Making)  39 year old female who presents to the Emergency Department for evaluation of painful swelling of the left lower extremity with history of varicose veins, and popping sensation.  Potentially for slight rupture of varicose vein with subsequent bleeding which is since stopped.  No evidence of ongoing expanding swelling or other issues.  Left lower extremity ultrasound was performed with no evidence of DVT.  Reassurance provided.  Follow-up in clinic as needed.  Return precautions are discussed.       I have reviewed the nursing notes.    I have reviewed the findings, diagnosis, plan and need for follow up with the patient.         Medical Decision Making  The patient's presentation was of low complexity (an acute and uncomplicated illness or injury).    The patient's evaluation involved:  ordering and/or review of 1 test(s) in this encounter (see separate area of note for  details)    The patient's management necessitated only low risk treatment.        NEW PRESCRIPTIONS STARTED AT TODAY'S ER VISIT  Discharge Medication List as of 2/21/2025  6:38 PM          Final diagnoses:   Pain of left lower leg   Varicose veins of both lower extremities without ulcer or inflammation       2/21/2025   [facility] EMERGENCY DEPT       TimiHudson solitario MD  02/21/25 1858

## 2025-02-21 NOTE — ED TRIAGE NOTES
Left lower leg discomfort, has hx of vericose veins & felt one pop today. Has small bruise to area & small hard lump, painful to walk on, no hx of DVTs     Triage Assessment (Adult)       Row Name 02/21/25 9938          Triage Assessment    Airway WDL WDL        Respiratory WDL    Respiratory WDL WDL        Skin Circulation/Temperature WDL    Skin Circulation/Temperature WDL WDL        Cardiac WDL    Cardiac WDL WDL        Peripheral/Neurovascular WDL    Peripheral Neurovascular WDL WDL        Cognitive/Neuro/Behavioral WDL    Cognitive/Neuro/Behavioral WDL WDL

## 2025-02-22 NOTE — DISCHARGE INSTRUCTIONS
You can apply heat to the area.  This will help absorb any potential blood in that area.    No evidence of blood clots.    Follow-up in clinic as needed.

## 2025-03-24 ENCOUNTER — TELEPHONE (OUTPATIENT)
Dept: FAMILY MEDICINE | Facility: CLINIC | Age: 40
End: 2025-03-24
Payer: COMMERCIAL

## 2025-03-24 NOTE — TELEPHONE ENCOUNTER
Patient Quality Outreach    Patient is due for the following:   Cervical Cancer Screening - PAP Needed  Physical Preventive Adult Physical    Action(s) Taken:   Pt to schedule    Type of outreach:    Sent letter.    Questions for provider review:    None         Tevin Briseno  Chart routed to none.

## 2025-03-24 NOTE — LETTER
March 24, 2025    To  Ninfa Caicedo  [address] [facility] [address]    Your team at [facility] cares about your health. We have reviewed your chart and based on our findings; we are making the following recommendations to better manage your health.     You are in particular need of attention regarding the following:     Schedule a primary care office visit with your provider for a Pap Smear to screen for Cervical Cancer.  PREVENTATIVE VISIT: Physical/pap    If you have already completed these items, please contact the clinic via phone or   [facility] so your care team can review and update your records. Thank you for   choosing [facility] for your healthcare needs. For any questions,   concerns, or to schedule an appointment please contact our clinic.    Healthy Regards,      Your [facility] Care Team           Electronically signed

## 2025-04-27 ASSESSMENT — PATIENT HEALTH QUESTIONNAIRE - PHQ9
10. IF YOU CHECKED OFF ANY PROBLEMS, HOW DIFFICULT HAVE THESE PROBLEMS MADE IT FOR YOU TO DO YOUR WORK, TAKE CARE OF THINGS AT HOME, OR GET ALONG WITH OTHER PEOPLE: NOT DIFFICULT AT ALL
SUM OF ALL RESPONSES TO PHQ QUESTIONS 1-9: 1
SUM OF ALL RESPONSES TO PHQ QUESTIONS 1-9: 1

## 2025-04-28 ENCOUNTER — OFFICE VISIT (OUTPATIENT)
Dept: FAMILY MEDICINE | Facility: CLINIC | Age: 40
End: 2025-04-28
Payer: COMMERCIAL

## 2025-04-28 VITALS
TEMPERATURE: 98.7 F | BODY MASS INDEX: 30.47 KG/M2 | SYSTOLIC BLOOD PRESSURE: 132 MMHG | WEIGHT: 189.6 LBS | HEIGHT: 66 IN | HEART RATE: 84 BPM | RESPIRATION RATE: 20 BRPM | DIASTOLIC BLOOD PRESSURE: 88 MMHG

## 2025-04-28 DIAGNOSIS — F41.1 GAD (GENERALIZED ANXIETY DISORDER): ICD-10-CM

## 2025-04-28 DIAGNOSIS — S29.012A STRAIN OF LATISSIMUS DORSI MUSCLE, INITIAL ENCOUNTER: Primary | ICD-10-CM

## 2025-04-28 PROCEDURE — 99214 OFFICE O/P EST MOD 30 MIN: CPT | Performed by: NURSE PRACTITIONER

## 2025-04-28 PROCEDURE — 3079F DIAST BP 80-89 MM HG: CPT | Performed by: NURSE PRACTITIONER

## 2025-04-28 PROCEDURE — 3075F SYST BP GE 130 - 139MM HG: CPT | Performed by: NURSE PRACTITIONER

## 2025-04-28 PROCEDURE — 1126F AMNT PAIN NOTED NONE PRSNT: CPT | Performed by: NURSE PRACTITIONER

## 2025-04-28 RX ORDER — HYDROXYZINE HYDROCHLORIDE 25 MG/1
25 TABLET, FILM COATED ORAL 3 TIMES DAILY PRN
Qty: 30 TABLET | Refills: 3 | Status: SHIPPED | OUTPATIENT
Start: 2025-04-28

## 2025-04-28 ASSESSMENT — PAIN SCALES - GENERAL: PAINLEVEL_OUTOF10: NO PAIN (0)

## 2025-04-28 NOTE — PATIENT INSTRUCTIONS
Make an appointment with Physical Therapy for evaluation and treatment.  Follow-up only if not improving.  I filled you with as needed Hydroxyzine for anxiety.  I will refill this for a year and just put in for refills if you run out of my prescription.  Follow-up if any changes in your anxiety with worsening symptoms.

## 2025-04-28 NOTE — PROGRESS NOTES
Assessment & Plan     Strain of latissimus dorsi muscle, initial encounter  No concerning symptoms on exam today.  Due to pinpoint localized tenderness likely strain of the latissimus dorsi muscle on the left side.  Ordered physical therapy since she is good to be starting softball to help try to heal this a little quicker.  Handout given on muscle strain and instructions on symptom management.  Plan to follow-up if not seeing any improvement.  - Physical Therapy  Referral; Future    LIZY (generalized anxiety disorder)  Patient did not tolerate the Celexa really well and is requesting getting back on hydroxyzine as needed since she prefers to take something for the anxiety as needed when it starts to bother her more.  Will plan to use the hydroxyzine as needed and follow-up if any changes in mental health with her anxiety.  - hydrOXYzine HCl (ATARAX) 25 MG tablet; Take 1 tablet (25 mg) by mouth 3 times daily as needed for anxiety.    See AVS for patient instructions.    Bandar Ocasio is a 39 year old, presenting for the following health issues:  Pain        4/28/2025     3:47 PM   Additional Questions   Roomed by Valerie LEE   Accompanied by self         4/28/2025     3:47 PM   Patient Reported Additional Medications   Patient reports taking the following new medications none     History of Present Illness       Reason for visit:  Pain under my left armpit       For one year has pain in left ribcage with swelling after wearing bra all day. Pain is worse when she touches it.     Patient states that when she is wearing a bra she has no pain in this area unless she is touching it and with the bra off it is painful and tender.  There is no lumps or bumps, bruising, any known injury to this area.  She states that it has been bothering her the last few months.  She did change bras but did not see a significant improvement.    Patient also states that she was having some symptoms of headache and dizziness  "with her Celexa so she discontinued this.  She was put on the hydroxyzine in the ER prior to starting a daily medication and would like to go back to this as needed.      Review of Systems  CONSTITUTIONAL: NEGATIVE for fever, chills, change in weight  RESP: NEGATIVE for significant cough or SOB  CV: NEGATIVE for chest pain, palpitations or peripheral edema  MUSCULOSKELETAL: POSITIVE  for pinpoint tenderness in the left mid back along the bra line as per HPI  PSYCHIATRIC: POSITIVE for anxiety  ROS otherwise negative      Objective    /88 (BP Location: Right arm, Patient Position: Sitting, Cuff Size: Adult Regular)   Pulse 84   Temp 98.7  F (37.1  C) (Tympanic)   Resp 20   Ht 1.67 m (5' 5.75\")   Wt 86 kg (189 lb 9.6 oz)   LMP 04/03/2025   BMI 30.84 kg/m    Body mass index is 30.84 kg/m .  Physical Exam   GENERAL: alert and no distress  MS: Patient has pinpoint tenderness in the mid lateral posterior back, she is very guarded with palpation, however there are no induration or bruising seen.  PSYCH: mentation appears normal, affect normal/bright    Signed Electronically by: Radha Vanessa NP    "

## 2025-05-07 ENCOUNTER — HOSPITAL ENCOUNTER (EMERGENCY)
Facility: CLINIC | Age: 40
Discharge: HOME | End: 2025-05-07
Payer: COMMERCIAL

## 2025-05-07 VITALS
OXYGEN SATURATION: 100 % | BODY MASS INDEX: 30.05 KG/M2 | HEIGHT: 66 IN | TEMPERATURE: 97.9 F | WEIGHT: 187 LBS | RESPIRATION RATE: 18 BRPM | SYSTOLIC BLOOD PRESSURE: 133 MMHG | DIASTOLIC BLOOD PRESSURE: 86 MMHG | HEART RATE: 86 BPM

## 2025-05-07 DIAGNOSIS — M54.50 ACUTE RIGHT-SIDED LOW BACK PAIN WITHOUT SCIATICA: Primary | ICD-10-CM

## 2025-05-07 PROCEDURE — 99284 EMERGENCY DEPT VISIT MOD MDM: CPT

## 2025-05-07 PROCEDURE — 99283 EMERGENCY DEPT VISIT LOW MDM: CPT

## 2025-05-07 RX ORDER — PREDNISONE 20 MG/1
TABLET ORAL
Qty: 10 TABLET | Refills: 0 | Status: SHIPPED | OUTPATIENT
Start: 2025-05-07

## 2025-05-07 RX ORDER — OXYCODONE HYDROCHLORIDE 5 MG/1
5 TABLET ORAL EVERY 6 HOURS PRN
Qty: 3 TABLET | Refills: 0 | Status: SHIPPED | OUTPATIENT
Start: 2025-05-07 | End: 2025-05-10

## 2025-05-07 ASSESSMENT — ACTIVITIES OF DAILY LIVING (ADL)
ADLS_ACUITY_SCORE: 41
ADLS_ACUITY_SCORE: 41

## 2025-05-07 NOTE — ED PROVIDER NOTES
"[facility]  Emergency Department Visit Note    PATIENT:  Ninfa Caicedo     39 year old     female      [id number]    Chief complaint:  Chief Complaint   Patient presents with    Back Pain     right lower back-started on Saturday. No known injury        History of present illness:  Patient is a 39 year old female with generalized anxiety disorder  presenting for evaluation of nontraumatic back pain.    I reviewed outpatient clinic note 4/28/2025.  Diagnosed at that time with strain of latissimus dorsi muscle.  Physical therapy ordered at that time, patient states has not received a call for this and so has not been able to set anything up.    Presents today due to pain that started on Saturday when she awoke.  Located now right low back/upper buttock.  Does occasionally have some paresthesia that wraps around to the anterior proximal thigh, but no symptoms that extend to or beyond the knee.  The pain is worse with weightbearing with the right leg, as well as with position changes.  Has had difficulty finding comfortable positions.    Has otherwise felt well.  No fevers.  No chest pain or dyspnea.  No abdominal pain.  No urinary frequency or dysuria.  Currently on menstrual period, normal for her, no abnormal vaginal bleeding or discharge.  Normal bowel movements.  No incontinence.  No saddle anesthesia.  Feels weak in the right lower extremity due to the pain, but otherwise not having extremity weakness.      Review of Systems:  As in HPI above    /86   Pulse 86   Temp 97.9  F (36.6  C) (Oral)   Resp 18   Ht 1.676 m (5' 6\")   Wt 84.8 kg (187 lb)   LMP 04/03/2025   SpO2 100%   BMI 30.18 kg/m        Physical Exam:  Constitutional: Standing next to hospital bed, appears uncomfortable, grimacing throughout history with position changes, alert, oriented, answering questions appropriately  HEENT: normocephalic, atraumatic and sclerae anicteric  Neck: no stridor  Cardiovascular: " regular rate and rhythm  Pulmonary: breathing comfortably on room air  Abdominal: soft, non-tender, non-distended  Extremities/MSK: Right-sided paraspinous/upper buttock area of tenderness, not particularly reproducibly tender to palpation.  No overlying skin changes.  No midline lower T or L-spine tenderness.  She was able to get herself into the bed without assistance for further exam.  She has inducible pain with right sided straight leg raise, but relief of pain with right-sided contralateral straight leg raise.  She has very mild inducible pain with left-sided straight leg raise but negative left-sided contralateral straight leg raise.   Skin: warm, dry  Neurologic: moves all four extremities spontaneously and ambulates unassisted though antalgic.  5 out of 5 dorsiflexion and plantarflexion straight bilaterally.  Sensation intact to light touch bilateral lower extremities.  Psychiatric: calm, appropriate      MDM:  Patient is a 39 year old female with above history presenting for evaluation of nontraumatic low back pain.    Vitals are normal, she is afebrile. Exam is overall reassuring, she is alert and appears well, albeit uncomfortable due to the pain.    Presentation consistent with musculoskeletal pain including muscular strain.  Lumbar radiculopathy possible.  No red flag symptoms concerning for spinal cord involvement or cauda equina.  Nothing on history or exam to suggest intra-abdominal or pelvic etiology.  No indication for advanced imaging or labs.    We discussed the at times challenging approach to management of nontraumatic low back pain but focusing on acetaminophen and ibuprofen.  We discussed that the evidence for oral steroids is quite minimal, though she feels exasperated and willing to try really anything for the pain so sending with 5-day course of prednisone.  Sending with 3 tablets of 5 mg oxycodone for severe pain at night.  Sending with physical therapy referral.  ED return to the event  of new or worsening symptoms.  She expressed understanding of and agreement with this.    Disposition discharge. Remainder of ED course below.    ED COURSE:       Encounter Diagnoses:  Final diagnoses:   Acute right-sided low back pain without sciatica       Final disposition: discharge    Jay Grover MD  5/7/2025  8:07 AM   Emergency Medicine  [facility]      Jay Grover MD  05/07/25 0952

## 2025-05-07 NOTE — ED TRIAGE NOTES
Right lower back pain-started on Saturday. Radiates into anterior hip and up back and down leg. Denies numbness/tingling in LE. Denies numbness in groin, denies loss of bowel or bladder control. No known injury. Was seen for pain in left lower back last week-states that side is fine today. Pt took ibuprofen this AM without improvement. Offered ice/heat-pt declined     Triage Assessment (Adult)       Row Name 05/07/25 0818          Triage Assessment    Airway WDL WDL        Respiratory WDL    Respiratory WDL WDL        Skin Circulation/Temperature WDL    Skin Circulation/Temperature WDL WDL        Cardiac WDL    Cardiac WDL WDL        Cognitive/Neuro/Behavioral WDL    Cognitive/Neuro/Behavioral WDL WDL

## 2025-05-07 NOTE — DISCHARGE INSTRUCTIONS
You were seen in the emergency department today for back pain.  This is very likely due to some sort of muscular strain, or perhaps due to a lumbar radiculopathy, sometimes referred to as a pinched nerve.  This can be challenging to manage, and focuses on anti-inflammatories to help with probably inflammation around the muscle and nerves of the low back.    You should take acetaminophen (Tylenol) or ibuprofen for your pain. You can alternate these every three hours as needed. So, for example, you could take acetaminophen at noon, then ibuprofen at 3pm, then acetaminophen again at 6pm, and so on. Do not take more Tylenol or ibuprofen than the daily maximum dose as indicated on the bottle.    As we discussed, although the evidence for oral steroids is limited for this type of pain, I am also starting you on a steroid medication called prednisone.  You should take this in the morning.    I am also sending you with a prescription for a medication called oxycodone. This is a powerful pain medicine you can take as needed for pain. You should first try acetaminophen (Tylenol) or ibuprofen to help with pain, and only take oxycodone if the first medication does not help. Take this medication only as prescribed on the bottle. Do not drink alcohol, operate heavy machinery (such as a car), or make important life decisions while taking this medication.     Finally, I am sending you with a referral for physical therapy, meet with them to discuss approaches to reducing inflammation and assisting with pain and mobility.    Please follow up with your primary doctor as needed if symptoms do not improve for ongoing evaluation and management of your symptoms.    Return to the emergency department with new or worsening symptoms that you find concerning.

## 2025-05-09 PROBLEM — M54.50 ACUTE RIGHT-SIDED LOW BACK PAIN WITHOUT SCIATICA: Status: ACTIVE | Noted: 2025-05-09

## 2025-06-21 ENCOUNTER — HEALTH MAINTENANCE LETTER (OUTPATIENT)
Age: 40
End: 2025-06-21

## (undated) RX ORDER — LIDOCAINE HYDROCHLORIDE 10 MG/ML
INJECTION, SOLUTION EPIDURAL; INFILTRATION; INTRACAUDAL; PERINEURAL
Status: DISPENSED
Start: 2025-01-23

## (undated) RX ORDER — PROPOFOL 10 MG/ML
INJECTION, EMULSION INTRAVENOUS
Status: DISPENSED
Start: 2025-01-23